# Patient Record
Sex: MALE | Race: WHITE | NOT HISPANIC OR LATINO | Employment: OTHER | URBAN - METROPOLITAN AREA
[De-identification: names, ages, dates, MRNs, and addresses within clinical notes are randomized per-mention and may not be internally consistent; named-entity substitution may affect disease eponyms.]

---

## 2021-09-14 ENCOUNTER — LAB REQUISITION (OUTPATIENT)
Dept: LAB | Facility: CLINIC | Age: 86
End: 2021-09-14
Payer: MEDICARE

## 2021-09-14 DIAGNOSIS — I10 ESSENTIAL (PRIMARY) HYPERTENSION: ICD-10-CM

## 2021-09-14 DIAGNOSIS — E03.9 HYPOTHYROIDISM, UNSPECIFIED: ICD-10-CM

## 2021-09-14 DIAGNOSIS — D64.9 ANEMIA, UNSPECIFIED: ICD-10-CM

## 2021-09-14 DIAGNOSIS — E53.8 DEFICIENCY OF OTHER SPECIFIED B GROUP VITAMINS: ICD-10-CM

## 2021-09-14 DIAGNOSIS — E61.2 MAGNESIUM DEFICIENCY: ICD-10-CM

## 2021-09-14 DIAGNOSIS — E55.9 VITAMIN D DEFICIENCY, UNSPECIFIED: ICD-10-CM

## 2021-09-15 LAB
ANION GAP SERPL CALCULATED.3IONS-SCNC: 9 MMOL/L (ref 5–18)
BUN SERPL-MCNC: 23 MG/DL (ref 8–28)
CALCIUM SERPL-MCNC: 9.1 MG/DL (ref 8.5–10.5)
CHLORIDE BLD-SCNC: 103 MMOL/L (ref 98–107)
CO2 SERPL-SCNC: 32 MMOL/L (ref 22–31)
CREAT SERPL-MCNC: 1.21 MG/DL (ref 0.7–1.3)
DEPRECATED CALCIDIOL+CALCIFEROL SERPL-MC: 64 UG/L (ref 30–80)
ERYTHROCYTE [DISTWIDTH] IN BLOOD BY AUTOMATED COUNT: 15.9 % (ref 10–15)
GFR SERPL CREATININE-BSD FRML MDRD: 52 ML/MIN/1.73M2
GLUCOSE BLD-MCNC: 92 MG/DL (ref 70–125)
HCT VFR BLD AUTO: 37.8 % (ref 40–53)
HGB BLD-MCNC: 12.6 G/DL (ref 13.3–17.7)
MAGNESIUM SERPL-MCNC: 2 MG/DL (ref 1.8–2.6)
MCH RBC QN AUTO: 29.9 PG (ref 26.5–33)
MCHC RBC AUTO-ENTMCNC: 33.3 G/DL (ref 31.5–36.5)
MCV RBC AUTO: 90 FL (ref 78–100)
PLATELET # BLD AUTO: 164 10E3/UL (ref 150–450)
POTASSIUM BLD-SCNC: 3.9 MMOL/L (ref 3.5–5)
RBC # BLD AUTO: 4.21 10E6/UL (ref 4.4–5.9)
SODIUM SERPL-SCNC: 144 MMOL/L (ref 136–145)
TSH SERPL DL<=0.005 MIU/L-ACNC: 2.43 UIU/ML (ref 0.3–5)
VIT B12 SERPL-MCNC: 679 PG/ML (ref 213–816)
WBC # BLD AUTO: 4.8 10E3/UL (ref 4–11)

## 2021-09-15 PROCEDURE — 80048 BASIC METABOLIC PNL TOTAL CA: CPT | Mod: ORL | Performed by: NURSE PRACTITIONER

## 2021-09-15 PROCEDURE — P9604 ONE-WAY ALLOW PRORATED TRIP: HCPCS | Mod: ORL | Performed by: NURSE PRACTITIONER

## 2021-09-15 PROCEDURE — 83735 ASSAY OF MAGNESIUM: CPT | Mod: ORL | Performed by: NURSE PRACTITIONER

## 2021-09-15 PROCEDURE — 84443 ASSAY THYROID STIM HORMONE: CPT | Mod: ORL | Performed by: NURSE PRACTITIONER

## 2021-09-15 PROCEDURE — 85027 COMPLETE CBC AUTOMATED: CPT | Mod: ORL | Performed by: NURSE PRACTITIONER

## 2021-09-15 PROCEDURE — 36415 COLL VENOUS BLD VENIPUNCTURE: CPT | Mod: ORL | Performed by: NURSE PRACTITIONER

## 2021-09-15 PROCEDURE — 82306 VITAMIN D 25 HYDROXY: CPT | Mod: ORL | Performed by: NURSE PRACTITIONER

## 2021-09-15 PROCEDURE — 82607 VITAMIN B-12: CPT | Mod: ORL | Performed by: NURSE PRACTITIONER

## 2021-12-09 ENCOUNTER — LAB REQUISITION (OUTPATIENT)
Dept: LAB | Facility: CLINIC | Age: 86
End: 2021-12-09
Payer: MEDICARE

## 2021-12-09 DIAGNOSIS — N39.0 URINARY TRACT INFECTION, SITE NOT SPECIFIED: ICD-10-CM

## 2021-12-09 LAB
ALBUMIN UR-MCNC: 70 MG/DL
APPEARANCE UR: CLEAR
BACTERIA #/AREA URNS HPF: ABNORMAL /HPF
BILIRUB UR QL STRIP: NEGATIVE
COLOR UR AUTO: ABNORMAL
GLUCOSE UR STRIP-MCNC: NEGATIVE MG/DL
HGB UR QL STRIP: ABNORMAL
KETONES UR STRIP-MCNC: NEGATIVE MG/DL
LEUKOCYTE ESTERASE UR QL STRIP: ABNORMAL
MUCOUS THREADS #/AREA URNS LPF: PRESENT /LPF
NITRATE UR QL: NEGATIVE
PH UR STRIP: 7 [PH] (ref 5–7)
RBC URINE: >182 /HPF
SP GR UR STRIP: 1.01 (ref 1–1.03)
UROBILINOGEN UR STRIP-MCNC: <2 MG/DL
WBC CLUMPS #/AREA URNS HPF: PRESENT /HPF
WBC URINE: 51 /HPF

## 2021-12-09 PROCEDURE — 81001 URINALYSIS AUTO W/SCOPE: CPT | Mod: ORL | Performed by: NURSE PRACTITIONER

## 2021-12-09 PROCEDURE — 87088 URINE BACTERIA CULTURE: CPT | Mod: ORL | Performed by: NURSE PRACTITIONER

## 2021-12-12 LAB — BACTERIA UR CULT: ABNORMAL

## 2021-12-16 ENCOUNTER — LAB REQUISITION (OUTPATIENT)
Dept: LAB | Facility: CLINIC | Age: 86
End: 2021-12-16
Payer: MEDICARE

## 2021-12-16 DIAGNOSIS — Z11.52 ENCOUNTER FOR SCREENING FOR COVID-19: ICD-10-CM

## 2021-12-16 PROCEDURE — U0005 INFEC AGEN DETEC AMPLI PROBE: HCPCS | Mod: ORL | Performed by: NURSE PRACTITIONER

## 2021-12-17 LAB — SARS-COV-2 RNA RESP QL NAA+PROBE: NEGATIVE

## 2021-12-20 ENCOUNTER — LAB REQUISITION (OUTPATIENT)
Dept: LAB | Facility: CLINIC | Age: 86
End: 2021-12-20
Payer: MEDICARE

## 2021-12-20 DIAGNOSIS — J09.X2 INFLUENZA DUE TO IDENTIFIED NOVEL INFLUENZA A VIRUS WITH OTHER RESPIRATORY MANIFESTATIONS: ICD-10-CM

## 2021-12-20 LAB
FLUAV AG SPEC QL IA: NEGATIVE
FLUBV AG SPEC QL IA: NEGATIVE

## 2021-12-20 PROCEDURE — 87804 INFLUENZA ASSAY W/OPTIC: CPT | Mod: ORL | Performed by: NURSE PRACTITIONER

## 2022-01-01 ENCOUNTER — LAB REQUISITION (OUTPATIENT)
Dept: LAB | Facility: CLINIC | Age: 87
End: 2022-01-01
Payer: MEDICARE

## 2022-01-01 DIAGNOSIS — D64.9 ANEMIA, UNSPECIFIED: ICD-10-CM

## 2022-01-01 DIAGNOSIS — I10 ESSENTIAL (PRIMARY) HYPERTENSION: ICD-10-CM

## 2022-01-01 LAB
ANION GAP SERPL CALCULATED.3IONS-SCNC: 14 MMOL/L (ref 7–15)
BUN SERPL-MCNC: 28 MG/DL (ref 8–23)
CALCIUM SERPL-MCNC: 9.8 MG/DL (ref 8.2–9.6)
CHLORIDE SERPL-SCNC: 98 MMOL/L (ref 98–107)
CREAT SERPL-MCNC: 1.18 MG/DL (ref 0.67–1.17)
DEPRECATED HCO3 PLAS-SCNC: 30 MMOL/L (ref 22–29)
ERYTHROCYTE [DISTWIDTH] IN BLOOD BY AUTOMATED COUNT: 16.1 % (ref 10–15)
GFR SERPL CREATININE-BSD FRML MDRD: 58 ML/MIN/1.73M2
GLUCOSE SERPL-MCNC: 111 MG/DL (ref 70–99)
HCT VFR BLD AUTO: 44.7 % (ref 40–53)
HGB BLD-MCNC: 13.8 G/DL (ref 13.3–17.7)
MCH RBC QN AUTO: 27.4 PG (ref 26.5–33)
MCHC RBC AUTO-ENTMCNC: 30.9 G/DL (ref 31.5–36.5)
MCV RBC AUTO: 89 FL (ref 78–100)
PLATELET # BLD AUTO: 162 10E3/UL (ref 150–450)
POTASSIUM SERPL-SCNC: 3.5 MMOL/L (ref 3.4–5.3)
RBC # BLD AUTO: 5.03 10E6/UL (ref 4.4–5.9)
SODIUM SERPL-SCNC: 142 MMOL/L (ref 136–145)
WBC # BLD AUTO: 5.6 10E3/UL (ref 4–11)

## 2022-01-01 PROCEDURE — 85027 COMPLETE CBC AUTOMATED: CPT | Mod: ORL | Performed by: NURSE PRACTITIONER

## 2022-01-01 PROCEDURE — 36415 COLL VENOUS BLD VENIPUNCTURE: CPT | Mod: ORL | Performed by: NURSE PRACTITIONER

## 2022-01-01 PROCEDURE — 80048 BASIC METABOLIC PNL TOTAL CA: CPT | Mod: ORL | Performed by: NURSE PRACTITIONER

## 2022-01-01 PROCEDURE — P9604 ONE-WAY ALLOW PRORATED TRIP: HCPCS | Mod: ORL | Performed by: NURSE PRACTITIONER

## 2022-01-07 ENCOUNTER — LAB REQUISITION (OUTPATIENT)
Dept: LAB | Facility: CLINIC | Age: 87
End: 2022-01-07
Payer: MEDICARE

## 2022-01-07 DIAGNOSIS — N39.0 URINARY TRACT INFECTION, SITE NOT SPECIFIED: ICD-10-CM

## 2022-01-07 LAB
ALBUMIN UR-MCNC: 300 MG/DL
APPEARANCE UR: ABNORMAL
BACTERIA #/AREA URNS HPF: ABNORMAL /HPF
BILIRUB UR QL STRIP: NEGATIVE
COLOR UR AUTO: YELLOW
GLUCOSE UR STRIP-MCNC: NEGATIVE MG/DL
HGB UR QL STRIP: ABNORMAL
KETONES UR STRIP-MCNC: NEGATIVE MG/DL
LEUKOCYTE ESTERASE UR QL STRIP: ABNORMAL
MUCOUS THREADS #/AREA URNS LPF: PRESENT /LPF
NITRATE UR QL: NEGATIVE
PH UR STRIP: 6 [PH] (ref 5–7)
RBC URINE: 98 /HPF
SP GR UR STRIP: 1.02 (ref 1–1.03)
UROBILINOGEN UR STRIP-MCNC: <2 MG/DL
WBC URINE: 71 /HPF

## 2022-01-07 PROCEDURE — 81001 URINALYSIS AUTO W/SCOPE: CPT | Mod: ORL | Performed by: NURSE PRACTITIONER

## 2022-01-07 PROCEDURE — 87086 URINE CULTURE/COLONY COUNT: CPT | Mod: ORL | Performed by: NURSE PRACTITIONER

## 2022-01-08 LAB — BACTERIA UR CULT: NO GROWTH

## 2022-01-11 ENCOUNTER — LAB REQUISITION (OUTPATIENT)
Dept: LAB | Facility: CLINIC | Age: 87
End: 2022-01-11
Payer: MEDICARE

## 2022-01-11 DIAGNOSIS — I10 ESSENTIAL (PRIMARY) HYPERTENSION: ICD-10-CM

## 2022-01-11 DIAGNOSIS — D64.9 ANEMIA, UNSPECIFIED: ICD-10-CM

## 2022-01-12 LAB
ANION GAP SERPL CALCULATED.3IONS-SCNC: 8 MMOL/L (ref 5–18)
BUN SERPL-MCNC: 23 MG/DL (ref 8–28)
CALCIUM SERPL-MCNC: 9.4 MG/DL (ref 8.5–10.5)
CHLORIDE BLD-SCNC: 102 MMOL/L (ref 98–107)
CO2 SERPL-SCNC: 32 MMOL/L (ref 22–31)
CREAT SERPL-MCNC: 1.05 MG/DL (ref 0.7–1.3)
ERYTHROCYTE [DISTWIDTH] IN BLOOD BY AUTOMATED COUNT: 16.1 % (ref 10–15)
GFR SERPL CREATININE-BSD FRML MDRD: 67 ML/MIN/1.73M2
GLUCOSE BLD-MCNC: 80 MG/DL (ref 70–125)
HCT VFR BLD AUTO: 40.4 % (ref 40–53)
HGB BLD-MCNC: 13 G/DL (ref 13.3–17.7)
MCH RBC QN AUTO: 28.9 PG (ref 26.5–33)
MCHC RBC AUTO-ENTMCNC: 32.2 G/DL (ref 31.5–36.5)
MCV RBC AUTO: 90 FL (ref 78–100)
PLATELET # BLD AUTO: 141 10E3/UL (ref 150–450)
POTASSIUM BLD-SCNC: 3.9 MMOL/L (ref 3.5–5)
RBC # BLD AUTO: 4.5 10E6/UL (ref 4.4–5.9)
SODIUM SERPL-SCNC: 142 MMOL/L (ref 136–145)
WBC # BLD AUTO: 4.7 10E3/UL (ref 4–11)

## 2022-01-12 PROCEDURE — 85027 COMPLETE CBC AUTOMATED: CPT | Mod: ORL | Performed by: NURSE PRACTITIONER

## 2022-01-12 PROCEDURE — P9604 ONE-WAY ALLOW PRORATED TRIP: HCPCS | Mod: ORL | Performed by: NURSE PRACTITIONER

## 2022-01-12 PROCEDURE — 80048 BASIC METABOLIC PNL TOTAL CA: CPT | Mod: ORL | Performed by: NURSE PRACTITIONER

## 2022-01-12 PROCEDURE — 36415 COLL VENOUS BLD VENIPUNCTURE: CPT | Mod: ORL | Performed by: NURSE PRACTITIONER

## 2022-05-24 ENCOUNTER — MEDICAL CORRESPONDENCE (OUTPATIENT)
Dept: MEDSURG UNIT | Facility: HOSPITAL | Age: 87
End: 2022-05-24

## 2022-06-14 ENCOUNTER — LAB REQUISITION (OUTPATIENT)
Dept: LAB | Facility: CLINIC | Age: 87
End: 2022-06-14
Payer: MEDICARE

## 2022-06-14 DIAGNOSIS — D64.9 ANEMIA, UNSPECIFIED: ICD-10-CM

## 2022-06-14 DIAGNOSIS — E83.42 HYPOMAGNESEMIA: ICD-10-CM

## 2022-06-14 DIAGNOSIS — I10 ESSENTIAL (PRIMARY) HYPERTENSION: ICD-10-CM

## 2022-06-15 LAB
ANION GAP SERPL CALCULATED.3IONS-SCNC: 12 MMOL/L (ref 5–18)
BUN SERPL-MCNC: 23 MG/DL (ref 8–28)
CALCIUM SERPL-MCNC: 9.4 MG/DL (ref 8.5–10.5)
CHLORIDE BLD-SCNC: 101 MMOL/L (ref 98–107)
CO2 SERPL-SCNC: 29 MMOL/L (ref 22–31)
CREAT SERPL-MCNC: 0.81 MG/DL (ref 0.7–1.3)
ERYTHROCYTE [DISTWIDTH] IN BLOOD BY AUTOMATED COUNT: 14.8 % (ref 10–15)
GFR SERPL CREATININE-BSD FRML MDRD: 83 ML/MIN/1.73M2
GLUCOSE BLD-MCNC: 90 MG/DL (ref 70–125)
HCT VFR BLD AUTO: 40.6 % (ref 40–53)
HGB BLD-MCNC: 13.4 G/DL (ref 13.3–17.7)
MAGNESIUM SERPL-MCNC: 1.9 MG/DL (ref 1.8–2.6)
MCH RBC QN AUTO: 28.6 PG (ref 26.5–33)
MCHC RBC AUTO-ENTMCNC: 33 G/DL (ref 31.5–36.5)
MCV RBC AUTO: 87 FL (ref 78–100)
PLATELET # BLD AUTO: 142 10E3/UL (ref 150–450)
POTASSIUM BLD-SCNC: 3.1 MMOL/L (ref 3.5–5)
RBC # BLD AUTO: 4.68 10E6/UL (ref 4.4–5.9)
SODIUM SERPL-SCNC: 142 MMOL/L (ref 136–145)
WBC # BLD AUTO: 5.1 10E3/UL (ref 4–11)

## 2022-06-15 PROCEDURE — 36415 COLL VENOUS BLD VENIPUNCTURE: CPT | Mod: ORL | Performed by: NURSE PRACTITIONER

## 2022-06-15 PROCEDURE — P9604 ONE-WAY ALLOW PRORATED TRIP: HCPCS | Mod: ORL | Performed by: NURSE PRACTITIONER

## 2022-06-15 PROCEDURE — 80048 BASIC METABOLIC PNL TOTAL CA: CPT | Mod: ORL | Performed by: NURSE PRACTITIONER

## 2022-06-15 PROCEDURE — 83735 ASSAY OF MAGNESIUM: CPT | Mod: ORL | Performed by: NURSE PRACTITIONER

## 2022-06-15 PROCEDURE — 85027 COMPLETE CBC AUTOMATED: CPT | Mod: ORL | Performed by: NURSE PRACTITIONER

## 2022-06-16 ENCOUNTER — LAB REQUISITION (OUTPATIENT)
Dept: LAB | Facility: CLINIC | Age: 87
End: 2022-06-16
Payer: MEDICARE

## 2022-06-16 DIAGNOSIS — E87.6 HYPOKALEMIA: ICD-10-CM

## 2022-06-17 LAB — POTASSIUM BLD-SCNC: 3.2 MMOL/L (ref 3.5–5)

## 2022-06-17 PROCEDURE — 36415 COLL VENOUS BLD VENIPUNCTURE: CPT | Mod: ORL | Performed by: NURSE PRACTITIONER

## 2022-06-17 PROCEDURE — P9604 ONE-WAY ALLOW PRORATED TRIP: HCPCS | Mod: ORL | Performed by: NURSE PRACTITIONER

## 2022-06-17 PROCEDURE — 84132 ASSAY OF SERUM POTASSIUM: CPT | Mod: ORL | Performed by: NURSE PRACTITIONER

## 2022-06-26 ENCOUNTER — APPOINTMENT (OUTPATIENT)
Dept: CT IMAGING | Facility: HOSPITAL | Age: 87
End: 2022-06-26
Attending: EMERGENCY MEDICINE
Payer: MEDICARE

## 2022-06-26 ENCOUNTER — APPOINTMENT (OUTPATIENT)
Dept: RADIOLOGY | Facility: HOSPITAL | Age: 87
End: 2022-06-26
Attending: EMERGENCY MEDICINE
Payer: MEDICARE

## 2022-06-26 ENCOUNTER — HOSPITAL ENCOUNTER (OUTPATIENT)
Facility: HOSPITAL | Age: 87
Setting detail: OBSERVATION
Discharge: HOME | End: 2022-06-27
Attending: EMERGENCY MEDICINE | Admitting: INTERNAL MEDICINE
Payer: MEDICARE

## 2022-06-26 DIAGNOSIS — Z79.01 ANTICOAGULATED BY ANTICOAGULATION TREATMENT: ICD-10-CM

## 2022-06-26 DIAGNOSIS — I48.20 CHRONIC ATRIAL FIBRILLATION (H): ICD-10-CM

## 2022-06-26 DIAGNOSIS — N30.00 ACUTE CYSTITIS WITHOUT HEMATURIA: ICD-10-CM

## 2022-06-26 DIAGNOSIS — Z93.59 SUPRAPUBIC CATHETER (H): ICD-10-CM

## 2022-06-26 DIAGNOSIS — M62.81 GENERALIZED MUSCLE WEAKNESS: ICD-10-CM

## 2022-06-26 DIAGNOSIS — J18.9 PNEUMONIA OF BOTH LUNGS DUE TO INFECTIOUS ORGANISM, UNSPECIFIED PART OF LUNG: ICD-10-CM

## 2022-06-26 PROBLEM — N39.0 URINARY TRACT INFECTION ASSOCIATED WITH CYSTOSTOMY CATHETER (H): Status: ACTIVE | Noted: 2022-06-26

## 2022-06-26 PROBLEM — T83.510A URINARY TRACT INFECTION ASSOCIATED WITH CYSTOSTOMY CATHETER (H): Status: ACTIVE | Noted: 2022-06-26

## 2022-06-26 PROBLEM — I10 HTN (HYPERTENSION): Status: ACTIVE | Noted: 2022-06-26

## 2022-06-26 PROBLEM — E87.6 HYPOKALEMIA: Status: ACTIVE | Noted: 2022-06-26

## 2022-06-26 LAB
ALBUMIN SERPL-MCNC: 4.1 G/DL (ref 3.5–5)
ALBUMIN UR-MCNC: 100 MG/DL
ALP SERPL-CCNC: 80 U/L (ref 45–120)
ALT SERPL W P-5'-P-CCNC: 11 U/L (ref 0–45)
ANION GAP SERPL CALCULATED.3IONS-SCNC: 10 MMOL/L (ref 5–18)
APPEARANCE UR: ABNORMAL
AST SERPL W P-5'-P-CCNC: 17 U/L (ref 0–40)
ATRIAL RATE - MUSE: 64 BPM
BACTERIA #/AREA URNS HPF: ABNORMAL /HPF
BASOPHILS # BLD AUTO: 0 10E3/UL (ref 0–0.2)
BASOPHILS NFR BLD AUTO: 0 %
BILIRUB DIRECT SERPL-MCNC: 0.6 MG/DL
BILIRUB SERPL-MCNC: 1.7 MG/DL (ref 0–1)
BILIRUB UR QL STRIP: NEGATIVE
BUN SERPL-MCNC: 22 MG/DL (ref 8–28)
CALCIUM SERPL-MCNC: 9.4 MG/DL (ref 8.5–10.5)
CHLORIDE BLD-SCNC: 102 MMOL/L (ref 98–107)
CO2 SERPL-SCNC: 29 MMOL/L (ref 22–31)
COLOR UR AUTO: YELLOW
CREAT SERPL-MCNC: 1.14 MG/DL (ref 0.7–1.3)
DIASTOLIC BLOOD PRESSURE - MUSE: 83 MMHG
EOSINOPHIL # BLD AUTO: 0 10E3/UL (ref 0–0.7)
EOSINOPHIL NFR BLD AUTO: 0 %
ERYTHROCYTE [DISTWIDTH] IN BLOOD BY AUTOMATED COUNT: 15.1 % (ref 10–15)
GFR SERPL CREATININE-BSD FRML MDRD: 61 ML/MIN/1.73M2
GLUCOSE BLD-MCNC: 106 MG/DL (ref 70–125)
GLUCOSE UR STRIP-MCNC: NEGATIVE MG/DL
HCT VFR BLD AUTO: 43 % (ref 40–53)
HGB BLD-MCNC: 14.1 G/DL (ref 13.3–17.7)
HGB UR QL STRIP: ABNORMAL
HOLD SPECIMEN: NORMAL
HOLD SPECIMEN: NORMAL
IMM GRANULOCYTES # BLD: 0.2 10E3/UL
IMM GRANULOCYTES NFR BLD: 2 %
INTERPRETATION ECG - MUSE: NORMAL
KETONES UR STRIP-MCNC: NEGATIVE MG/DL
LACTATE SERPL-SCNC: 1.3 MMOL/L (ref 0.7–2)
LEUKOCYTE ESTERASE UR QL STRIP: ABNORMAL
LYMPHOCYTES # BLD AUTO: 0.6 10E3/UL (ref 0.8–5.3)
LYMPHOCYTES NFR BLD AUTO: 6 %
MAGNESIUM SERPL-MCNC: 2 MG/DL (ref 1.8–2.6)
MCH RBC QN AUTO: 28.5 PG (ref 26.5–33)
MCHC RBC AUTO-ENTMCNC: 32.8 G/DL (ref 31.5–36.5)
MCV RBC AUTO: 87 FL (ref 78–100)
MONOCYTES # BLD AUTO: 1 10E3/UL (ref 0–1.3)
MONOCYTES NFR BLD AUTO: 10 %
MUCOUS THREADS #/AREA URNS LPF: PRESENT /LPF
NEUTROPHILS # BLD AUTO: 8.9 10E3/UL (ref 1.6–8.3)
NEUTROPHILS NFR BLD AUTO: 82 %
NITRATE UR QL: NEGATIVE
NRBC # BLD AUTO: 0 10E3/UL
NRBC BLD AUTO-RTO: 0 /100
P AXIS - MUSE: 32 DEGREES
PH UR STRIP: 7 [PH] (ref 5–7)
PLATELET # BLD AUTO: 157 10E3/UL (ref 150–450)
POTASSIUM BLD-SCNC: 3.4 MMOL/L (ref 3.5–5)
POTASSIUM BLD-SCNC: 3.5 MMOL/L (ref 3.5–5)
PR INTERVAL - MUSE: NORMAL MS
PROT SERPL-MCNC: 7.2 G/DL (ref 6–8)
QRS DURATION - MUSE: 166 MS
QT - MUSE: 438 MS
QTC - MUSE: 475 MS
R AXIS - MUSE: 122 DEGREES
RBC # BLD AUTO: 4.95 10E6/UL (ref 4.4–5.9)
RBC URINE: 19 /HPF
SARS-COV-2 RNA RESP QL NAA+PROBE: NEGATIVE
SODIUM SERPL-SCNC: 141 MMOL/L (ref 136–145)
SP GR UR STRIP: 1.01 (ref 1–1.03)
SYSTOLIC BLOOD PRESSURE - MUSE: 162 MMHG
T AXIS - MUSE: -66 DEGREES
TROPONIN I SERPL-MCNC: 0.03 NG/ML (ref 0–0.29)
UROBILINOGEN UR STRIP-MCNC: <2 MG/DL
VENTRICULAR RATE- MUSE: 71 BPM
WBC # BLD AUTO: 10.6 10E3/UL (ref 4–11)
WBC CLUMPS #/AREA URNS HPF: PRESENT /HPF
WBC URINE: 74 /HPF

## 2022-06-26 PROCEDURE — 99219 PR INITIAL OBSERVATION CARE,LEVEL II: CPT | Performed by: INTERNAL MEDICINE

## 2022-06-26 PROCEDURE — 85025 COMPLETE CBC W/AUTO DIFF WBC: CPT | Performed by: EMERGENCY MEDICINE

## 2022-06-26 PROCEDURE — 36415 COLL VENOUS BLD VENIPUNCTURE: CPT | Performed by: EMERGENCY MEDICINE

## 2022-06-26 PROCEDURE — 96366 THER/PROPH/DIAG IV INF ADDON: CPT

## 2022-06-26 PROCEDURE — 84484 ASSAY OF TROPONIN QUANT: CPT | Performed by: EMERGENCY MEDICINE

## 2022-06-26 PROCEDURE — 258N000003 HC RX IP 258 OP 636: Performed by: INTERNAL MEDICINE

## 2022-06-26 PROCEDURE — 93005 ELECTROCARDIOGRAM TRACING: CPT | Performed by: EMERGENCY MEDICINE

## 2022-06-26 PROCEDURE — G0378 HOSPITAL OBSERVATION PER HR: HCPCS

## 2022-06-26 PROCEDURE — 96367 TX/PROPH/DG ADDL SEQ IV INF: CPT

## 2022-06-26 PROCEDURE — 96361 HYDRATE IV INFUSION ADD-ON: CPT

## 2022-06-26 PROCEDURE — 81001 URINALYSIS AUTO W/SCOPE: CPT | Performed by: EMERGENCY MEDICINE

## 2022-06-26 PROCEDURE — 83605 ASSAY OF LACTIC ACID: CPT | Performed by: EMERGENCY MEDICINE

## 2022-06-26 PROCEDURE — 96365 THER/PROPH/DIAG IV INF INIT: CPT

## 2022-06-26 PROCEDURE — 258N000003 HC RX IP 258 OP 636: Performed by: EMERGENCY MEDICINE

## 2022-06-26 PROCEDURE — 83735 ASSAY OF MAGNESIUM: CPT | Performed by: EMERGENCY MEDICINE

## 2022-06-26 PROCEDURE — 250N000011 HC RX IP 250 OP 636: Performed by: EMERGENCY MEDICINE

## 2022-06-26 PROCEDURE — 250N000013 HC RX MED GY IP 250 OP 250 PS 637: Performed by: INTERNAL MEDICINE

## 2022-06-26 PROCEDURE — 99285 EMERGENCY DEPT VISIT HI MDM: CPT | Mod: 25

## 2022-06-26 PROCEDURE — G1010 CDSM STANSON: HCPCS

## 2022-06-26 PROCEDURE — 96375 TX/PRO/DX INJ NEW DRUG ADDON: CPT

## 2022-06-26 PROCEDURE — 82248 BILIRUBIN DIRECT: CPT | Performed by: EMERGENCY MEDICINE

## 2022-06-26 PROCEDURE — 36415 COLL VENOUS BLD VENIPUNCTURE: CPT | Performed by: INTERNAL MEDICINE

## 2022-06-26 PROCEDURE — 71046 X-RAY EXAM CHEST 2 VIEWS: CPT

## 2022-06-26 PROCEDURE — 87635 SARS-COV-2 COVID-19 AMP PRB: CPT | Performed by: EMERGENCY MEDICINE

## 2022-06-26 PROCEDURE — C9803 HOPD COVID-19 SPEC COLLECT: HCPCS

## 2022-06-26 PROCEDURE — 87088 URINE BACTERIA CULTURE: CPT | Performed by: EMERGENCY MEDICINE

## 2022-06-26 PROCEDURE — 84132 ASSAY OF SERUM POTASSIUM: CPT | Performed by: INTERNAL MEDICINE

## 2022-06-26 RX ORDER — METOPROLOL TARTRATE 50 MG
50 TABLET ORAL 2 TIMES DAILY
COMMUNITY

## 2022-06-26 RX ORDER — METOPROLOL TARTRATE 25 MG/1
50 TABLET, FILM COATED ORAL 2 TIMES DAILY
Status: DISCONTINUED | OUTPATIENT
Start: 2022-06-26 | End: 2022-06-27 | Stop reason: HOSPADM

## 2022-06-26 RX ORDER — FUROSEMIDE 40 MG
40 TABLET ORAL DAILY
Status: DISCONTINUED | OUTPATIENT
Start: 2022-06-26 | End: 2022-06-27 | Stop reason: HOSPADM

## 2022-06-26 RX ORDER — PANTOPRAZOLE SODIUM 20 MG/1
20 TABLET, DELAYED RELEASE ORAL DAILY
COMMUNITY

## 2022-06-26 RX ORDER — AZITHROMYCIN 250 MG/1
250 TABLET, FILM COATED ORAL DAILY
Status: DISCONTINUED | OUTPATIENT
Start: 2022-06-27 | End: 2022-06-27 | Stop reason: HOSPADM

## 2022-06-26 RX ORDER — POTASSIUM CHLORIDE 1500 MG/1
20 TABLET, EXTENDED RELEASE ORAL ONCE
Status: COMPLETED | OUTPATIENT
Start: 2022-06-26 | End: 2022-06-26

## 2022-06-26 RX ORDER — PANTOPRAZOLE SODIUM 20 MG/1
20 TABLET, DELAYED RELEASE ORAL DAILY
Status: DISCONTINUED | OUTPATIENT
Start: 2022-06-26 | End: 2022-06-27 | Stop reason: HOSPADM

## 2022-06-26 RX ORDER — POTASSIUM CHLORIDE 750 MG/1
10 CAPSULE, EXTENDED RELEASE ORAL DAILY
Status: ON HOLD | COMMUNITY
End: 2023-01-01

## 2022-06-26 RX ORDER — CHOLECALCIFEROL (VITAMIN D3) 50 MCG
1 TABLET ORAL DAILY
COMMUNITY

## 2022-06-26 RX ORDER — CEFTRIAXONE 1 G/1
1 INJECTION, POWDER, FOR SOLUTION INTRAMUSCULAR; INTRAVENOUS EVERY 24 HOURS
Status: DISCONTINUED | OUTPATIENT
Start: 2022-06-27 | End: 2022-06-27 | Stop reason: HOSPADM

## 2022-06-26 RX ORDER — SODIUM CHLORIDE 9 MG/ML
INJECTION, SOLUTION INTRAVENOUS CONTINUOUS
Status: DISCONTINUED | OUTPATIENT
Start: 2022-06-26 | End: 2022-06-27 | Stop reason: HOSPADM

## 2022-06-26 RX ORDER — CEFTRIAXONE 2 G/1
2 INJECTION, POWDER, FOR SOLUTION INTRAMUSCULAR; INTRAVENOUS ONCE
Status: COMPLETED | OUTPATIENT
Start: 2022-06-26 | End: 2022-06-26

## 2022-06-26 RX ORDER — FUROSEMIDE 40 MG
40 TABLET ORAL DAILY
Status: ON HOLD | COMMUNITY
End: 2023-01-01

## 2022-06-26 RX ADMIN — APIXABAN 5 MG: 5 TABLET, FILM COATED ORAL at 20:46

## 2022-06-26 RX ADMIN — METOPROLOL TARTRATE 50 MG: 25 TABLET, FILM COATED ORAL at 20:47

## 2022-06-26 RX ADMIN — CEFTRIAXONE SODIUM 2 G: 2 INJECTION, POWDER, FOR SOLUTION INTRAMUSCULAR; INTRAVENOUS at 15:10

## 2022-06-26 RX ADMIN — PANTOPRAZOLE SODIUM 20 MG: 20 TABLET, DELAYED RELEASE ORAL at 20:47

## 2022-06-26 RX ADMIN — SODIUM CHLORIDE 500 ML: 9 INJECTION, SOLUTION INTRAVENOUS at 12:47

## 2022-06-26 RX ADMIN — POTASSIUM CHLORIDE 20 MEQ: 1500 TABLET, EXTENDED RELEASE ORAL at 21:46

## 2022-06-26 RX ADMIN — SODIUM CHLORIDE: 9 INJECTION, SOLUTION INTRAVENOUS at 18:44

## 2022-06-26 RX ADMIN — AZITHROMYCIN MONOHYDRATE 500 MG: 500 INJECTION, POWDER, LYOPHILIZED, FOR SOLUTION INTRAVENOUS at 15:45

## 2022-06-26 ASSESSMENT — ENCOUNTER SYMPTOMS
DIARRHEA: 0
ABDOMINAL PAIN: 0
FATIGUE: 1
VOMITING: 0
RHINORRHEA: 0
COUGH: 0
HEADACHES: 0
SHORTNESS OF BREATH: 0
EYES NEGATIVE: 1
WEAKNESS: 1
CONFUSION: 1

## 2022-06-26 NOTE — ED TRIAGE NOTES
Patient arrives by EMS from assisted living.  Per EMS report, patient's family was concerned with his sudden weakness.  Patient is normally independent with a walker and was requiring a assist of two to get out of the chair.  Patient also had sudden onset of intermittent confusion.  Patient has a suprapubic catheter that was changed last Thursday (per patient report).  Erythema around catheter site and rhys, cloudy urine.  BP enroute was 180's but patient hasn't taken his AM medications today, per EMS report.  On eliquis for Afib.  O2 sat 90% RA, placed patient on 2L O2 via NC.       Triage Assessment     Row Name 06/26/22 1152       Triage Assessment (Adult)    Airway WDL WDL       Respiratory WDL    Respiratory WDL WDL       Skin Circulation/Temperature WDL    Skin Circulation/Temperature WDL X  pallor       Cardiac WDL    Cardiac WDL X       Peripheral/Neurovascular WDL    Peripheral Neurovascular WDL WDL       Cognitive/Neuro/Behavioral WDL    Cognitive/Neuro/Behavioral WDL X    Level of Consciousness confused;lethargic  intermittent confusion with orientation questions    Arousal Level opens eyes spontaneously;arouses to voice    Orientation disoriented to;time;situation    Mood/Behavior flat affect;calm

## 2022-06-26 NOTE — PHARMACY-ADMISSION MEDICATION HISTORY
Pharmacy Note - Admission Medication History    Pertinent Provider Information: in the previous six months has used Bactrim DS and cefdinir.      ______________________________________________________________________    Prior To Admission (PTA) med list completed and updated in EMR.       PTA Med List   Medication Sig Last Dose     apixaban ANTICOAGULANT (ELIQUIS) 5 MG tablet Take 5 mg by mouth 2 times daily 6/25/2022 at pm     furosemide (LASIX) 40 MG tablet Take 40 mg by mouth daily 6/25/2022 at am     metoprolol tartrate (LOPRESSOR) 50 MG tablet Take 50 mg by mouth 2 times daily 6/25/2022 at pm     pantoprazole (PROTONIX) 20 MG EC tablet Take 20 mg by mouth daily 6/25/2022 at am     potassium chloride ER (MICRO-K) 10 MEQ CR capsule Take 10 mEq by mouth daily 6/25/2022 at am     vitamin D3 (CHOLECALCIFEROL) 50 mcg (2000 units) tablet Take 1 tablet by mouth daily 6/25/2022 at am       Information source(s): Patient, Family member, Caregiver, Clinic records, Facility ([facility] medication list/MAR and CareEverywhere/SureScripts  Method of interview communication: in-person    Summary of Changes to PTA Med List  New: nothing   Discontinued: losartan  Changed: decreased pantoprazole from 40 mg    Patient was asked about OTC/herbal products specifically.  PTA med list reflects this.    In the past week, patient estimated taking medication this percent of the time:  greater than 90%.    Allergies were reviewed, assessed, and updated with the patient.      Patient does not use any multi-dose medications prior to admission.    The information provided in this note is only as accurate as the sources available at the time of the update(s).    Thank you for the opportunity to participate in the care of this patient.    Alvaro Kaye [facility]  6/26/2022 2:06 PM

## 2022-06-26 NOTE — ED NOTES
Bed: JNED-04  Expected date: 6/26/22  Expected time: 11:30 AM  Means of arrival:   Comments:  weakness

## 2022-06-26 NOTE — ED PROVIDER NOTES
EMERGENCY DEPARTMENT ENCOUNTER      NAME: David Dow  AGE: 89 year old male  YOB: 1930  MRN: [id number]  EVALUATION DATE & TIME: 6/26/2022 11:44 AM    PCP: No primary care provider on file.    ED PROVIDER: Nicole Anderson M.D.      CHIEF COMPLAINT     Chief Complaint   Patient presents with     Generalized Weakness         FINAL IMPRESSION:     1. Generalized muscle weakness    2. Chronic atrial fibrillation (H)    3. Anticoagulated by anticoagulation treatment    4. Suprapubic catheter (H)    5. Acute cystitis without hematuria    6. Pneumonia of both lungs due to infectious organism, unspecified part of lung          MEDICAL DECISION MAKING:       Pertinent Labs & Imaging studies reviewed. (See chart for details)    89 year old male presents to the Emergency Department for evaluation of weakness unable to get out of the chair per family appears confused.    ED Course as of 06/26/22 1519   Sun Jun 26, 2022   1238 Mr. Dow is a 89-year-old gentleman who lives independently in assisted living with his wife.  He is the primary caregiver.  The family noted that today he was more confused than usual had a difficult time getting up from his recliner and they want him evaluated.  Patient has a history of suprapubic catheter.  Chronic A. fib anticoagulated.  Denies any trauma.   1239 Examination patient looks younger than stated age but looks like he does not feel well.  He is nontoxic.  Has dry mucous membranes.  Rhythm is almost regular.  Abdomen is soft he has a suprapubic catheter with some erythema but no gross purulence.  No lower extremity edema.  He is oriented to self only.   1239 Differential diagnosis include but not limited to intracranial hemorrhage sepsis electrolyte abnormalities anemia acute coronary syndrome among others.   1330 troponin 0.03.  Normal liver function test normal lactic acid normal white blood cell count and hemoglobin magnesium of 2.  Patient was sleeping saturation  decreased to 9.   1332 Patient with decreased saturations when sleeping to 90%.  Son states patient has a history of sleep apnea and does not wear his CPAP.   1333 Urea Nitrogen: 22   1348 Most recent urine culture negative but back in 2021 he grew out the Klebsiella susceptible to Rocephin therefore we will give.   1410 X-ray does reveal bilateral opacities concerning for pneumonia.  Already received Rocephin will do azithromycin for community-acquired pneumonia.   1421 spoke with hospitalist recommends MedSurg observation.   1422 Patient and family updated.   1422 Clinical impression and decision making 89-year-old male lives independently more weak today.  Has a suprapubic catheter urine concerning for infection.  Chest x-ray reveals pneumonia requiring minimal oxygen.  Started on Rocephin and azithromycin.   1422 .    Admitted for IV antibiotics PT evaluation         Vital Signs: Hypertension  EKG: Sinus rhythm and PVCs right bundle branch block inverted T waves in V1 V2 V3 V4 V5 2 3 aVF  Imaging: CT head no acute chest x-ray no megaly consolidation.  Home Meds: Reviewed  ED meds/abx: Rocephin azithromycin  Fluids: 500 NS    Labs  K 3.4  Cr 1.14  Wbc 10.6  Hgb 14.1  platelets  Troponin 0.03      Review of Previous Records  12/9/2021 [facility]  Urine culture tested positive for Klebsiella oxytoca resistant to ampicillin and cefazolin.     1/7/2022 [facility]  Urine culture did not have any growth     6/16/2022 [facility]  Labs were performed. Potassium was slightly decreased at 3.2.    Consults  Hospitalist -Dr. Osorio Ledesma    ED COURSE     11:55 AM I met with the patient, obtained history, performed an initial exam, and discussed options and plan for diagnostics and treatment here in the ED.     12:45 PM Patient's family has arrived and I spoke with them.     1:49 PM Updated patient and family.     2:14 PM Spoke with the hospitalist, Dr. Osorio Ledesma  [facility] observation    2:25 PM patient, wife, son updated in agreement with plan.  Patient somewhat upset about having to stay but son is explaining to patient that it is the recommended plan he eventually agrees.    At the conclusion of the encounter I discussed the results of all of the tests and the disposition. The questions were answered. The patient acknowledged understanding and was agreeable with the care plan.           MEDICATIONS GIVEN IN THE EMERGENCY:     Medications   cefTRIAXone (ROCEPHIN) 2 g vial to attach to  ml bag for ADULTS or NS 50 ml bag for PEDS (2 g Intravenous New Bag 6/26/22 1510)   azithromycin (ZITHROMAX) 500 mg in sodium chloride 0.9 % 250 mL intermittent infusion (has no administration in time range)   0.9% sodium chloride BOLUS (0 mLs Intravenous Stopped 6/26/22 1510)       NEW PRESCRIPTIONS STARTED AT TODAY'S ER VISIT     New Prescriptions    No medications on file          =================================================================    HPI     Patient information was obtained from: patient, patient's family, and EMS    Use of : N/A    David Dow is a 89 year old male who presents by EMS for evaluation of generalized weakness.    Per patient's son, patient woke up this morning feeling more tired than usual despite sleeping fine last night. Patient usually sleeps in a recliner and does not use his CPAP machine while doing so. Patient's wife helped patient to the bathroom with a walker with a seat to have a bowel movement. Afterwards, wife was able to get patient back to his recliner but not into it. He got stuck in a slumped over position due to weakness not loss of consciousness. The assisted living contacted his son, who came with his wife to check on the patient. Nursing aids were contacted to help move him but they were unable to. Family was able to move patient into his recliner, however, this was painful for the pain. Son called nonemergent EMS who  checked patient but decided he should be brought to the ED.     Per EMS, patient lives in an assisted living with his wife who is her primary caregiver. Upon EMS arrival, patient O2 saturation 92% on room air and glucose was 121. Patient was self-ambulatory and confused. Intermittently alert and oriented x3. EMS reported that he could sometimes give the correct year and month. Noted that patient has a history of atrial fibrillation and takes Eliquis. Patient is DNR. At baseline, patient is ambulatory and takes care of himself well.     Patient denies headache, eye problems, nose problems, mouth problems, chest pain, shortness of breath, cough, abdominal pain, emesis, diarrhea, or any other complaints at this time.     REVIEW OF SYSTEMS   Review of Systems   Constitutional: Positive for fatigue.   HENT: Negative for dental problem, mouth sores, nosebleeds and rhinorrhea.    Eyes: Negative.    Respiratory: Negative for cough and shortness of breath.    Cardiovascular: Negative for chest pain.   Gastrointestinal: Negative for abdominal pain, diarrhea and vomiting.   Neurological: Positive for weakness. Negative for headaches.        Negative for loss of consciousness.   Psychiatric/Behavioral: Positive for confusion.   All other systems reviewed and are negative.      PAST MEDICAL HISTORY:   History reviewed. No pertinent past medical history.    PAST SURGICAL HISTORY:   History reviewed. No pertinent surgical history.      CURRENT MEDICATIONS:   apixaban ANTICOAGULANT (ELIQUIS) 5 MG tablet  furosemide (LASIX) 40 MG tablet  metoprolol tartrate (LOPRESSOR) 50 MG tablet  pantoprazole (PROTONIX) 20 MG EC tablet  potassium chloride ER (MICRO-K) 10 MEQ CR capsule  vitamin D3 (CHOLECALCIFEROL) 50 mcg (2000 units) tablet         ALLERGIES:   No Known Allergies    FAMILY HISTORY:   History reviewed. No pertinent family history.    SOCIAL HISTORY:        VITALS:   BP (!) 152/86   Pulse 78   Temp 99.1  F (37.3  C) (Oral)    "Resp 21   Ht 1.651 m (5' 5\")   Wt 61.2 kg (135 lb)   SpO2 100%   BMI 22.47 kg/m      PHYSICAL EXAM     Physical Exam  Vitals and nursing note reviewed. Exam conducted with a chaperone present.   Constitutional:       Appearance: He is normal weight.      Comments: Nontoxic looks younger than stated age appears tired pale.   Skin:     Coloration: Skin is pale.   Neurological:      Mental Status: He is alert.      Comments: Oriented to self only.         Physical Exam   Constitutional: Pale, nontoxic, appears younger than stated age. No distress.     Head: Atraumatic.     Nose: Nose normal.     Mouth/Throat: Oropharynx is clear and moist.     Eyes: EOM are normal. Pupils are equal, round, and reactive to light.     Ears: Bilateral pearly white TMs.    Neck: Normal range of motion. Neck supple.     Cardiovascular: Normal rate, regular rhythm. 2/6 systolic murmur.     Pulmonary/Chest: Normal effort  and breath sounds normal.     Abdominal: Suprapubic catheter with surrounding erythema.     Musculoskeletal: Normal range of motion.     Neurological: Alert and oriented to self only.     Lymphatics: No lower extremity edema     : N/A    Skin: Skin is warm and dry.     Psychiatric: Normal mood and affect. Behavior is normal.       LAB:     All pertinent labs reviewed and interpreted.  Labs Ordered and Resulted from Time of ED Arrival to Time of ED Departure   BASIC METABOLIC PANEL - Abnormal       Result Value    Sodium 141      Potassium 3.4 (*)     Chloride 102      Carbon Dioxide (CO2) 29      Anion Gap 10      Urea Nitrogen 22      Creatinine 1.14      Calcium 9.4      Glucose 106      GFR Estimate 61     HEPATIC FUNCTION PANEL - Abnormal    Bilirubin Total 1.7 (*)     Bilirubin Direct 0.6 (*)     Protein Total 7.2      Albumin 4.1      Alkaline Phosphatase 80      AST 17      ALT 11     ROUTINE UA WITH MICROSCOPIC REFLEX TO CULTURE - Abnormal    Color Urine Yellow      Appearance Urine Turbid (*)     Glucose Urine " Negative      Bilirubin Urine Negative      Ketones Urine Negative      Specific Gravity Urine 1.014      Blood Urine 0.2 mg/dL (*)     pH Urine 7.0      Protein Albumin Urine 100  (*)     Urobilinogen Urine <2.0      Nitrite Urine Negative      Leukocyte Esterase Urine 500 Azul/uL (*)     Bacteria Urine Few (*)     WBC Clumps Urine Present (*)     Mucus Urine Present (*)     RBC Urine 19 (*)     WBC Urine 74 (*)    CBC WITH PLATELETS AND DIFFERENTIAL - Abnormal    WBC Count 10.6      RBC Count 4.95      Hemoglobin 14.1      Hematocrit 43.0      MCV 87      MCH 28.5      MCHC 32.8      RDW 15.1 (*)     Platelet Count 157      % Neutrophils 82      % Lymphocytes 6      % Monocytes 10      % Eosinophils 0      % Basophils 0      % Immature Granulocytes 2      NRBCs per 100 WBC 0      Absolute Neutrophils 8.9 (*)     Absolute Lymphocytes 0.6 (*)     Absolute Monocytes 1.0      Absolute Eosinophils 0.0      Absolute Basophils 0.0      Absolute Immature Granulocytes 0.2      Absolute NRBCs 0.0     MAGNESIUM - Normal    Magnesium 2.0     TROPONIN I - Normal    Troponin I 0.03     LACTIC ACID WHOLE BLOOD - Normal    Lactic Acid 1.3     COVID-19 VIRUS (CORONAVIRUS) BY PCR   URINE CULTURE        RADIOLOGY:     Reviewed all pertinent imaging. Please see official radiology report.  Chest XR,  PA & LAT   Final Result   IMPRESSION: Cardiomegaly with normal vascularity. Patchy bibasilar airspace opacity/pneumonia. No pleural effusion or pneumothorax. 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      Head CT w/o contrast   Final Result   IMPRESSION:   1.  No CT evidence for acute intracranial process.   2.  Brain atrophy and presumed chronic microvascular ischemic changes as above.           EKG:     EKG #1  Sinus rhythm right bundle branch block inverted T waves in 2 3 aVF V3 V4 V5  Rhythm is irregular.  Rhythm is irregular.  Patient does have a history of A. fib.    Time:[id number]    Ventricular  rate 71 bmp  Axis normal  LA interval ms  QRS duration 166 ms  QT//475 ms    Compared to previous EKG on no previous available for comparison  I have independently reviewed and interpreted the EKG(s) documented above.      PROCEDURES:     Procedures      I, Kylee Boles, am serving as a scribe to document services personally performed by Dr. Anderson based on my observation and the provider's statements to me. I, Nicole Anderson MD attest that Kylee Boles is acting in a scribe capacity, has observed my performance of the services and has documented them in accordance with my direction.    Nicole Anderson M.D.  Emergency Medicine  [facility] EMERGENCY DEPARTMENT  [facility] [facility] [address] [phone]  Dept: [phone]       Nicole Anderson MD  06/26/22 8625       Nicole Anderson MD  06/26/22 2162

## 2022-06-26 NOTE — H&P
Admission History and Physical   David Dow, 8/22/1930, [id number]  [facility]  No admission diagnoses are documented for this encounter.  PCP:System, Provider Not In, None   Admitting provider: Genevieve Cortez MD.    Code status:  No CPR- Do NOT Intubate          Extended Emergency Contact Information  Primary Emergency Contact: ANNALISA DOW  Home Phone: [phone]  Relation: Son  Secondary Emergency Contact: MYESHA DOW  Home Phone: [phone]  Relation: Daughter-in-Law       Assessment and Plan  Active Problems:    Generalized muscle weakness    Chronic atrial fibrillation (H)    Anticoagulated by anticoagulation treatment    Suprapubic catheter (H)    HTN (hypertension)    Urinary tract infection associated with cystostomy catheter (H)    Pneumonia of both lungs due to infectious organism    Hypokalemia    David Dow is a 89 year old male presenting with weakness     CAUTI, patient has chronic indwelling moreno per patient recently exchanged  - Continue IV ceftriaxone as previous Ucx sensitivities   - Ucx pending   - gentle IVF    CAP without SOB or cough, but looks dry   - Ceftriaxone and azithromycin  - gentle IVF  - hold lasix for now     Chronic afib  - continue metoprolol with hold parameters   - Continue eliquis     HTN  - metoprolol as above     Hypokalemia  - potassium replacement protocol     Weakness  - PT/OT     COVID-19 PCR Results    COVID-19 PCR Results 12/16/21   SARS CoV2 PCR Negative      Comments are available for some flowsheets but are not being displayed.         COVID-19 Antibody Results, Testing for Immunity    COVID-19 Antibody Results, Testing for Immunity   No data to display.           VTE prophylaxis:  DOAC  DIET: Orders Placed This Encounter      Low Saturated Fat Na <2400 mg    Drains/Lines: SPC  Weight bearing status: WBAt  Disposition/Barriers to discharge: pending therapy and improvement with abx   Code Status:No CPR- Do NOT Intubate    HPI: David Dow is  a 89 year old old male with h/o generalized weakness.     Per patient's son, patient woke up this morning feeling more tired than usual despite sleeping fine last night. Patient usually sleeps in a recliner and does not use his CPAP machine while doing so. Patient's wife helped patient to the bathroom with a walker with a seat to have a bowel movement. Afterwards, wife was able to get patient back to his recliner but not into it. He got stuck in a slumped over position due to weakness not loss of consciousness. The assisted living contacted his son, who came with his wife to check on the patient. Nursing aids were contacted to help move him but they were unable to. Family was able to move patient into his recliner, however, this was painful for the pain. Son called nonemergent EMS who checked patient but decided he should be brought to the ED.      Per EMS, patient lives in an assisted living with his wife who is her primary caregiver. Upon EMS arrival, patient O2 saturation 92% on room air and glucose was 121. Patient was self-ambulatory and confused. Intermittently alert and oriented x3. EMS reported that he could sometimes give the correct year and month. Noted that patient has a history of atrial fibrillation and takes Eliquis. Patient is DNR. At baseline, patient is ambulatory and takes care of himself well.      Patient denies headache, eye problems, nose problems, mouth problems, chest pain, shortness of breath, cough, abdominal pain, emesis, diarrhea, or any other complaints at this time.     Past Medical History:   Diagnosis Date     Anticoagulated by anticoagulation treatment 6/26/2022     Chronic atrial fibrillation (H) 6/26/2022     HTN (hypertension) 6/26/2022     Suprapubic catheter (H) 6/26/2022     History reviewed. No pertinent surgical history.  History reviewed. No pertinent family history.  Social History     Socioeconomic History     Marital status:      Spouse name: Not on file      Number of children: Not on file     Years of education: Not on file     Highest education level: Not on file   Occupational History     Not on file   Tobacco Use     Smoking status: Not on file     Smokeless tobacco: Not on file   Substance and Sexual Activity     Alcohol use: Not on file     Drug use: Not on file     Sexual activity: Not on file   Other Topics Concern     Not on file   Social History Narrative     Not on file     Social Determinants of Health     Financial Resource Strain: Not on file   Food Insecurity: Not on file   Transportation Needs: Not on file   Physical Activity: Not on file   Stress: Not on file   Social Connections: Not on file   Intimate Partner Violence: Not on file   Housing Stability: Not on file     No Known Allergies    PRIOR TO ADMISSION MEDICATIONS   (Not in a hospital admission)       REVIEW OF SYSTEMS:  12 point reviewed pertinent negatives and positives in HPI all others negative     PHYSICAL EXAM  Temp:  [99.1  F (37.3  C)] 99.1  F (37.3  C)  Pulse:  [65-78] 68  Resp:  [16-24] 20  BP: (144-182)/(67-86) 144/67  SpO2:  [93 %-100 %] 98 %  Wt Readings from Last 1 Encounters:   06/26/22 61.2 kg (135 lb)     Body mass index is 22.47 kg/m .  Physical Exam  Constitutional:       Comments: Chronically ill-appearing but NAD,    HENT:      Head: Normocephalic and atraumatic.      Nose: Nose normal.      Mouth/Throat:      Mouth: Mucous membranes are moist.      Pharynx: Oropharynx is clear.   Eyes:      Extraocular Movements: Extraocular movements intact.      Conjunctiva/sclera: Conjunctivae normal.   Neurological:      Mental Status: He is alert.         PERTINENT LABS and RADIOLOGY   Results for orders placed or performed during the hospital encounter of 06/26/22   Chest XR,  PA & LAT    Impression    IMPRESSION: Cardiomegaly with normal vascularity. Patchy bibasilar airspace opacity/pneumonia. No pleural effusion or pneumothorax. Left shoulder arthroplasty and advanced degenerative  arthropathy right shoulder.   Head CT w/o contrast    Impression    IMPRESSION:  1.  No CT evidence for acute intracranial process.  2.  Brain atrophy and presumed chronic microvascular ischemic changes as above.       Recent Labs   Lab 06/26/22  1235   WBC 10.6   HGB 14.1   MCV 87         POTASSIUM 3.4*   CHLORIDE 102   CO2 29   BUN 22   CR 1.14   ANIONGAP 10   JOSESITO 9.4      ALBUMIN 4.1   PROTTOTAL 7.2   BILITOTAL 1.7*   ALKPHOS 80   ALT 11   AST 17     EKG:RBBB, sinus rhythm       Genevieve Cortez MD  [facility] Medicine Service  [phone]

## 2022-06-27 ENCOUNTER — APPOINTMENT (OUTPATIENT)
Dept: OCCUPATIONAL THERAPY | Facility: HOSPITAL | Age: 87
End: 2022-06-27
Attending: INTERNAL MEDICINE
Payer: MEDICARE

## 2022-06-27 ENCOUNTER — APPOINTMENT (OUTPATIENT)
Dept: PHYSICAL THERAPY | Facility: HOSPITAL | Age: 87
End: 2022-06-27
Attending: INTERNAL MEDICINE
Payer: MEDICARE

## 2022-06-27 VITALS
RESPIRATION RATE: 20 BRPM | SYSTOLIC BLOOD PRESSURE: 138 MMHG | OXYGEN SATURATION: 95 % | TEMPERATURE: 98.2 F | WEIGHT: 135 LBS | BODY MASS INDEX: 22.49 KG/M2 | DIASTOLIC BLOOD PRESSURE: 68 MMHG | HEART RATE: 57 BPM | HEIGHT: 65 IN

## 2022-06-27 LAB
ANION GAP SERPL CALCULATED.3IONS-SCNC: 9 MMOL/L (ref 5–18)
BUN SERPL-MCNC: 20 MG/DL (ref 8–28)
CALCIUM SERPL-MCNC: 8.6 MG/DL (ref 8.5–10.5)
CHLORIDE BLD-SCNC: 106 MMOL/L (ref 98–107)
CO2 SERPL-SCNC: 28 MMOL/L (ref 22–31)
CREAT SERPL-MCNC: 1.17 MG/DL (ref 0.7–1.3)
ERYTHROCYTE [DISTWIDTH] IN BLOOD BY AUTOMATED COUNT: 15.1 % (ref 10–15)
GFR SERPL CREATININE-BSD FRML MDRD: 59 ML/MIN/1.73M2
GLUCOSE BLD-MCNC: 83 MG/DL (ref 70–125)
HCT VFR BLD AUTO: 37 % (ref 40–53)
HGB BLD-MCNC: 11.9 G/DL (ref 13.3–17.7)
MCH RBC QN AUTO: 28.5 PG (ref 26.5–33)
MCHC RBC AUTO-ENTMCNC: 32.2 G/DL (ref 31.5–36.5)
MCV RBC AUTO: 89 FL (ref 78–100)
PLATELET # BLD AUTO: 130 10E3/UL (ref 150–450)
POTASSIUM BLD-SCNC: 4.2 MMOL/L (ref 3.5–5)
RBC # BLD AUTO: 4.18 10E6/UL (ref 4.4–5.9)
SODIUM SERPL-SCNC: 143 MMOL/L (ref 136–145)
WBC # BLD AUTO: 7.4 10E3/UL (ref 4–11)

## 2022-06-27 PROCEDURE — 97165 OT EVAL LOW COMPLEX 30 MIN: CPT | Mod: GO

## 2022-06-27 PROCEDURE — 250N000013 HC RX MED GY IP 250 OP 250 PS 637: Performed by: INTERNAL MEDICINE

## 2022-06-27 PROCEDURE — 36415 COLL VENOUS BLD VENIPUNCTURE: CPT | Performed by: INTERNAL MEDICINE

## 2022-06-27 PROCEDURE — 97535 SELF CARE MNGMENT TRAINING: CPT | Mod: GO

## 2022-06-27 PROCEDURE — 258N000003 HC RX IP 258 OP 636: Performed by: INTERNAL MEDICINE

## 2022-06-27 PROCEDURE — G0378 HOSPITAL OBSERVATION PER HR: HCPCS

## 2022-06-27 PROCEDURE — 97110 THERAPEUTIC EXERCISES: CPT | Mod: GP | Performed by: PHYSICAL THERAPIST

## 2022-06-27 PROCEDURE — 99217 PR OBSERVATION CARE DISCHARGE: CPT | Performed by: INTERNAL MEDICINE

## 2022-06-27 PROCEDURE — 85027 COMPLETE CBC AUTOMATED: CPT | Performed by: INTERNAL MEDICINE

## 2022-06-27 PROCEDURE — 97116 GAIT TRAINING THERAPY: CPT | Mod: GP | Performed by: PHYSICAL THERAPIST

## 2022-06-27 PROCEDURE — 97162 PT EVAL MOD COMPLEX 30 MIN: CPT | Mod: GP | Performed by: PHYSICAL THERAPIST

## 2022-06-27 PROCEDURE — 80048 BASIC METABOLIC PNL TOTAL CA: CPT | Performed by: INTERNAL MEDICINE

## 2022-06-27 PROCEDURE — 96361 HYDRATE IV INFUSION ADD-ON: CPT

## 2022-06-27 RX ORDER — AZITHROMYCIN 250 MG/1
250 TABLET, FILM COATED ORAL DAILY
Qty: 4 TABLET | Refills: 0 | Status: SHIPPED | OUTPATIENT
Start: 2022-06-28 | End: 2023-01-01

## 2022-06-27 RX ADMIN — SODIUM CHLORIDE: 9 INJECTION, SOLUTION INTRAVENOUS at 05:08

## 2022-06-27 RX ADMIN — AZITHROMYCIN MONOHYDRATE 250 MG: 250 TABLET ORAL at 10:52

## 2022-06-27 RX ADMIN — PANTOPRAZOLE SODIUM 20 MG: 20 TABLET, DELAYED RELEASE ORAL at 09:37

## 2022-06-27 RX ADMIN — METOPROLOL TARTRATE 50 MG: 25 TABLET, FILM COATED ORAL at 09:37

## 2022-06-27 RX ADMIN — APIXABAN 5 MG: 5 TABLET, FILM COATED ORAL at 09:37

## 2022-06-27 NOTE — PLAN OF CARE
Physical Therapy Discharge Summary    Reason for therapy discharge:    Discharged to home.    Progress towards therapy goal(s). See goals on Care Plan in [facility] electronic health record for goal details.  Goals partially met.  Barriers to achieving goals:   discharge on same date as initial evaluation.    Therapy recommendation(s):    Continued therapy is recommended.  Rationale/Recommendations:  Recommended home PT, pt declining at this time.     Betsy Abad, PT  6/27/2022

## 2022-06-27 NOTE — CONSULTS
Care Management Initial Consult    General Information  Assessment completed with: Patient, Children,    Type of CM/SW Visit: Initial Assessment    Primary Care Provider verified and updated as needed:     Readmission within the last 30 days:     Reason for Consult: discharge planning  Advance Care Planning:          Communication Assessment  Patient's communication style: spoken language (English or Bilingual)    Hearing Difficulty or Deaf: no   Wear Glasses or Blind: yes    Cognitive  Cognitive/Neuro/Behavioral: .WDL except  Level of Consciousness: confused, lethargic (intermittent confusion with orientation questions)  Arousal Level: opens eyes spontaneously, arouses to voice  Orientation: disoriented to, time  Mood/Behavior: flat affect, calm          Living Environment:   People in home: spouse  Eva  Current living Arrangements: assisted living      Able to return to prior arrangements: yes     Family/Social Support:  Care provided by: self, spouse/significant other  Provides care for: no one  Marital Status:   Wife, Children  Eva       Description of Support System: Supportive, Involved    Support Assessment: Adequate family and caregiver support    Current Resources:   Patient receiving home care services: No     Community Resources:    Equipment currently used at home: walker, rolling, grab bar, tub/shower, shower chair  Supplies currently used at home:      Employment/Financial:  Employment Status: retired        Financial Concerns: No concerns identified   Referral to Financial Worker: No     Lifestyle & Psychosocial Needs:  Social Determinants of Health     Tobacco Use: Not on file   Alcohol Use: Not on file   Financial Resource Strain: Not on file   Food Insecurity: Not on file   Transportation Needs: Not on file   Physical Activity: Not on file   Stress: Not on file   Social Connections: Not on file   Intimate Partner Violence: Not on file   Depression: Not on file   Housing Stability:  Not on file     Functional Status:  Prior to admission patient needed assistance:      Mental Health Status:        Chemical Dependency Status:        Values/Beliefs:  Spiritual, Cultural Beliefs, Taoist Practices, Values that affect care:               Additional Information:  Met with patient to review role of care management, progression of care and possible need for services at discharge, including OP services, home care, or skilled nursing care.   Pt states he resides at [facility] ([facility]) with his spouse, Eva. Pt states he does not receive any services from [facility] staff for himself, but his wife does.  He ambulates independently with a walker.  He states he is independent with ADLs.  He does not drive and his son, Carlos A provides transportation and helps with medication management and shopping.    Discussed recommendation for Home Care services for PT and OT and pt declines these services.  He states his son can provide transport home when ready.    11:30 AM   Received update from hospitalist that pt would like to discharge today and that pt's spouse and son are here and want to discuss home care services again.    Met with pt, spouse and son, Carlos A.  Discussed recommendation for Home PT and OT.  After lengthy conversation and encouragement from son, pt continues to decline services.  So requests writer check with [facility] to see if they will accept pt back without Home Care services.    Call placed to ZA Parisi at [facility].  Reviewed therapy recommendations and provided update from their assessment.  She states pt may return without home care services.  Ailin requests discharge orders and summary be faxed to [phone].    Met with pt, spouse and son again and provided update and they are all in agreement with discharge home at [facility] with OP f/u.    Update provided to hospitalist.    Rosibel Perrin RN

## 2022-06-27 NOTE — PROGRESS NOTES
Pt. Discharged to assisted living with his wife. Discharge paperwork reviewed with his son, who takes care of all of his medication and assists at his home when needed. Discharge medications given to son.     Huma Raya RN

## 2022-06-27 NOTE — PROVIDER NOTIFICATION
Provider Notification:    Reason for Communication:  on telemetry.HR decreased to 42 at the lowest. averaging mid to upper 40s while asleep. 50s to 60s while awake. asymptomatic.   afib with BBB and prolonged QT.   what can we put for lower limit parameters?    Team Member Name & Role:  Dr Escobar    Method of Communication:  Page    Response:  Waiting for response

## 2022-06-27 NOTE — PLAN OF CARE
PRIMARY DIAGNOSIS: GENERALIZED WEAKNESS/UTI/Pneumonia    OUTPATIENT/OBSERVATION GOALS TO BE MET BEFORE DISCHARGE  1. Orthostatic performed: N/A    2. Tolerating PO medications: Yes    3. Return to near baseline physical activity: No    4. Cleared for discharge by consultants (if involved): No    Discharge Planner Nurse   Safe discharge environment identified: Yes  Barriers to discharge: Yes       Entered by: Marilou Sauceda RN 06/27/2022 1:05 AM     Please review provider order for any additional goals.   Nurse to notify provider when observation goals have been met and patient is ready for discharge.Goal Outcome Evaluation:    Plan of Care Reviewed With: patient

## 2022-06-27 NOTE — PLAN OF CARE
Goal Outcome Evaluation:    Plan of Care Reviewed With: patient        PRIMARY DIAGNOSIS: GENERALIZED WEAKNESS/UTI/Pneumonia    OUTPATIENT/OBSERVATION GOALS TO BE MET BEFORE DISCHARGE  1. Orthostatic performed: N/A    2. Tolerating PO medications: Yes    3. Return to near baseline physical activity: No    4. Cleared for discharge by consultants (if involved): No    Discharge Planner Nurse   Safe discharge environment identified: Yes  Barriers to discharge: Yes       Entered by: Marilou Sauceda RN 06/27/2022 1:03 AM     Please review provider order for any additional goals.   Nurse to notify provider when observation goals have been met and patient is ready for discharge.

## 2022-06-27 NOTE — PLAN OF CARE
Goal Outcome Evaluation:    Plan of Care Reviewed With: patient     PRIMARY DIAGNOSIS: GENERALIZED WEAKNESS/UTI/Pneumonia    OUTPATIENT/OBSERVATION GOALS TO BE MET BEFORE DISCHARGE  1. Orthostatic performed: N/A    2. Tolerating PO medications: Yes    3. Return to near baseline physical activity: No    4. Cleared for discharge by consultants (if involved): No    Discharge Planner Nurse   Safe discharge environment identified: Yes  Barriers to discharge: Yes       Entered by: Marilou Sauceda RN 06/27/2022 5:19 AM    Barriers to discharge: PT/OT consults, receiving IV antibiotics     Please review provider order for any additional goals.   Nurse to notify provider when observation goals have been met and patient is ready for discharge.    Patient verbalizes frustration regarding hospitalization. Wants to discharge today.  S/P cath in place - malodorous urine and sediment present. Has leg bag on and refused for nursing to change to drainage bag. Dressing change to S/P cath completed - site is red and flakey.   Slept well throughout the night. Denies pain.    Reviewed plan of care.   Marilou Sauceda RN  [phone]

## 2022-06-27 NOTE — PROGRESS NOTES
[facility]      OUTPATIENT PHYSICAL THERAPY EVALUATION  PLAN OF TREATMENT FOR OUTPATIENT REHABILITATION  (COMPLETE FOR INITIAL CLAIMS ONLY)  Patient's Last Name, First Name, M.I.  YOB: 1930  Victor MDavid  SERGIO                        Provider's Name  [facility] Medical Record No.  [id number]                               Onset Date:  06/26/22   Start of Care Date:  06/27/22      Type:     _X_PT   ___OT   ___SLP Medical Diagnosis:  urinary tract infection                        PT Diagnosis:  impaired functional mobility   Visits from SOC:  1   _________________________________________________________________________________  Plan of Treatment/Functional Goals    Planned Interventions: balance training, bed mobility training, home exercise program, gait training, patient/family education, strengthening, transfer training     Goals: See Physical Therapy Goals on Care Plan in [employer] electronic health record.    Therapy Frequency: Daily  Predicted Duration of Therapy Intervention: 07/04/22  _________________________________________________________________________________    I CERTIFY THE NEED FOR THESE SERVICES FURNISHED UNDER        THIS PLAN OF TREATMENT AND WHILE UNDER MY CARE     (Physician co-signature of this document indicates review and certification of the therapy plan).              Certification date from: 06/27/22, Certification date to: 07/04/22    Referring Physician: Genevieve Cortez MD            Initial Assessment        See Physical Therapy evaluation dated 06/27/22 in Epic electronic health record.

## 2022-06-27 NOTE — PLAN OF CARE
Occupational Therapy Discharge Summary    Reason for therapy discharge:    Discharged to home with home therapy.    Progress towards therapy goal(s). See goals on Care Plan in [facility] electronic health record for goal details.  Goals partially met.  Barriers to achieving goals:   discharge from facility.    Therapy recommendation(s):    Continued therapy is recommended.  Rationale/Recommendations:  To improve overall ADL independence/safety .    Goal Outcome Evaluation:

## 2022-06-27 NOTE — PROGRESS NOTES
Occupational Therapy      06/27/22 0745   Quick Adds   Type of Visit Initial Occupational Therapy Evaluation   Living Environment   People in Home spouse   Current Living Arrangements assisted living  ([facility] assisted [facility])   Home Accessibility no concerns   Transportation Anticipated family or friend will provide   Living Environment Comments W/I shower w/ GB/SC   Self-Care   Equipment Currently Used at Home other (see comments)  (4ww)   Fall history within last six months no   Activity/Exercise/Self-Care Comment Per pt report pt is Ind. w/ all self cares- there is staff there who can assist w/ self cares if needed. Per chart review pt is spouses primary caregiver at [facility]   Instrumental Activities of Daily Living (IADL)   IADL Comments Pt stated daughter in law does some cleaning/housework- pt/spouse cook light meals but they also go to [employer] for meals too   General Information   Onset of Illness/Injury or Date of Surgery 06/26/22   Referring Physician Boby Arellano MBBS   Patient/Family Therapy Goal Statement (OT) to get home   Additional Occupational Profile Info/Pertinent History of Current Problem Chronic ind.[place]no catheter, CAP w/o SOB, chronic A-fib, HTN, presenting w/ weakness   Existing Precautions/Restrictions fall   Cognitive Status Examination   Orientation Status person   Range of Motion Comprehensive   General Range of Motion no range of motion deficits identified   Bed Mobility   Bed Mobility supine-sit;sit-supine   Supine-Sit [place] (Bed Mobility) minimum assist (75% patient effort);supervision   Sit-Supine [place] (Bed Mobility) supervision;verbal cues   Assistive Device (Bed Mobility) bed rails   Transfers   Transfers sit-stand transfer   Sit-Stand Transfer   Sit-Stand [place] (Transfers) contact guard;verbal cues;supervision   Assistive Device (Sit-Stand Transfers) walker, front-wheeled   Activities of Daily Living   BADL Assessment/Intervention upper body  dressing;lower body dressing   Upper Body Dressing Assessment/Training   Position (Upper Body Dressing) unsupported sitting   [place] Level (Upper Body Dressing) minimum assist (75% patient effort);verbal cues   Lower Body Dressing Assessment/Training   Position (Lower Body Dressing) unsupported sitting   [place] Level (Lower Body Dressing) moderate assist (50% patient effort);verbal cues   Clinical Impression   Criteria for Skilled Therapeutic Interventions Met (OT) Yes, treatment indicated   OT Diagnosis Decreased ADL ind.   OT Problem List-Impairments impacting ADL problems related to;activity tolerance impaired;mobility;cognition   Assessment of Occupational Performance 1-3 Performance Deficits   Planned Therapy Interventions (OT) ADL retraining;balance training;cognition;transfer training   Intervention Comments toileting/LB dressing, safety awareness/cognition, balance/trnf safety   Clinical Decision Making Complexity (OT) low complexity   Risk & Benefits of therapy have been explained evaluation/treatment results reviewed;patient   OT Discharge Planning   OT Discharge Recommendation (DC Rec) home with assist;home with home care occupational therapy   OT Rationale for DC Rec CGAx1 for trnf/mobility w/ fww at this time d/t decreased balance- If d/c back to [facility] OT recommending assist w/ bathing/trnfs to ensure safety upon d/c. Pt may benefit from increase in services at facility to monitior safety/ADL ind.   Total Evaluation Time (Minutes)   Total Evaluation Time (Minutes) 8   OT Goals   Therapy Frequency (OT) Daily   OT Predicted Duration/Target Date for Goal Attainment 07/06/22   OT Goals Lower Body Dressing;Transfers;Toilet Transfer/Toileting;Cognition   OT: Lower Body Dressing Modified independent;using adaptive equipment   OT: Transfer Modified independent;with assistive device   OT: Toilet Transfer/Toileting Modified independent;using adaptive equipment   OT: Cognitive Patient/caregiver will  verbalize understanding of cognitive assessment results/recommendations as needed for safe discharge planning

## 2022-06-27 NOTE — DISCHARGE SUMMARY
[facility] MEDICINE  DISCHARGE SUMMARY     Primary Care Physician: System, Provider Not In  Admission Date: 6/26/2022   Discharge Provider: ELICIA Gomes Discharge Date: 6/27/2022   Diet: As below   Code Status: No CPR- Do NOT Intubate   Activity: DCACTIVITY: Activity as tolerated        Condition at Discharge: Stable     REASON FOR PRESENTATION(See Admission Note for Details)   Weakness    PRINCIPAL & ACTIVE DISCHARGE DIAGNOSES     Active Problems:    Generalized muscle weakness    Chronic atrial fibrillation (H)    Anticoagulated by anticoagulation treatment    Suprapubic catheter (H)    HTN (hypertension)    Urinary tract infection associated with cystostomy catheter (H)    Pneumonia of both lungs due to infectious organism    Hypokalemia      PENDING LABS     Unresulted Labs Ordered in the Past 30 Days of this Admission     Date and Time Order Name Status Description    6/26/2022  1:36 PM Urine Culture Preliminary             PROCEDURES ( this hospitalization only)          RECOMMENDATIONS TO OUTPATIENT PROVIDER FOR F/U VISIT     Follow-up Appointments     Follow-up and recommended labs and tests       Follow up with primary care provider, Provider Not In System, within 7   days for hospital follow- up.                 DISPOSITION     Home. Patient declined C.    SUMMARY OF HOSPITAL COURSE:      David Dow is a 89 year old male with PMH of hypertension, chronic afib on Eliquis, supra-pubic catheter, who was brought to our ED for evaluation of weakness.      CAUTI:   - Patient has a supra-pubic catheter in place. Per patient recently exchanged  - Abnormal UA noted.   - Ucx pending   - Treated with IV Rocephin while here.     CAP   - Treated with IV Ceftriaxone and azithromycin while here  - Patient feeling better. Insisted on going home ([facility]) today itself. Spouse and son were at bedside.   - Discharging home on PO augmentin and zithromax     Chronic afib  -  Continue metoprolol with hold parameters   - Continue eliquis      HTN  - Metoprolol as above      Hypokalemia  - Replaced     Generalized weakness  - PT/OT   - Patient declined [facility].    Discharge Medications with Med changes:     Discharge Medication List as of 6/27/2022  1:03 PM      START taking these medications    Details   amoxicillin-clavulanate (AUGMENTIN) 875-125 MG tablet Take 1 tablet by mouth 2 times daily for 7 days, Disp-14 tablet, R-0, E-Prescribe      azithromycin (ZITHROMAX) 250 MG tablet Take 1 tablet (250 mg) by mouth daily, Disp-4 tablet, R-0, E-Prescribe         CONTINUE these medications which have NOT CHANGED    Details   apixaban ANTICOAGULANT (ELIQUIS) 5 MG tablet Take 5 mg by mouth 2 times daily, Historical      furosemide (LASIX) 40 MG tablet Take 40 mg by mouth daily, Historical      metoprolol tartrate (LOPRESSOR) 50 MG tablet Take 50 mg by mouth 2 times daily, Historical      pantoprazole (PROTONIX) 20 MG EC tablet Take 20 mg by mouth daily, Historical      potassium chloride ER (MICRO-K) 10 MEQ CR capsule Take 10 mEq by mouth daily, Historical      vitamin D3 (CHOLECALCIFEROL) 50 mcg (2000 units) tablet Take 1 tablet by mouth daily, Historical                   Rationale for medication changes:      Please see the summary above        Consults   None      Immunizations given this encounter       There is no immunization history on file for this patient.        Anticoagulation Information      Recent INR results: No results for input(s): INR in the last 168 hours.  Warfarin doses (if applicable) or name of other anticoagulant: Eliquis      SIGNIFICANT IMAGING FINDINGS     Results for orders placed or performed during the hospital encounter of 06/26/22   Chest XR,  PA & LAT    Impression    IMPRESSION: Cardiomegaly with normal vascularity. Patchy bibasilar airspace opacity/pneumonia. No pleural effusion or pneumothorax. 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   Head CT w/o contrast    Impression    IMPRESSION:  1.  No CT evidence for acute intracranial process.  2.  Brain atrophy and presumed chronic microvascular ischemic changes as above.       SIGNIFICANT LABORATORY FINDINGS     Most Recent 3 CBC's:Recent Labs   Lab Test 06/27/22  0618 06/26/22  1235 06/15/22  0715   WBC 7.4 10.6 5.1   HGB 11.9* 14.1 13.4   MCV 89 87 87   * 157 142*     Most Recent 3 BMP's:Recent Labs   Lab Test 06/27/22  0618 06/26/22  1925 06/26/22  1235 06/17/22  0738 06/15/22  0715     --  141  --  142   POTASSIUM 4.2 3.5 3.4*   < > 3.1*   CHLORIDE 106  --  102  --  101   CO2 28  --  29  --  29   BUN 20  --  22  --  23   CR 1.17  --  1.14  --  0.81   ANIONGAP 9  --  10  --  12   JOSESITO 8.6  --  9.4  --  9.4   GLC 83  --  106  --  90    < > = values in this interval not displayed.     Most Recent 2 LFT's:Recent Labs   Lab Test 06/26/22  1235   AST 17   ALT 11   ALKPHOS 80   BILITOTAL 1.7*         Discharge Orders        Reason for your hospital stay    Weakness     Follow-up and recommended labs and tests     Follow up with primary care provider, Provider Not In System, within 7 days for hospital follow- up.     Activity    Your activity upon discharge: activity as tolerated     Diet    Follow this diet upon discharge: Orders Placed This Encounter      Low Saturated Fat Na <2400 mg       Examination   /68 (BP Location: Left arm)   Pulse 57   Temp 98.2  F (36.8  C) (Oral)   Resp 20   Ht 1.651 m (5' 5\")   Wt 61.2 kg (135 lb)   SpO2 95%   BMI 22.47 kg/m        General: Not in obvious distress.  HEENT: NC, AT   Chest: Clear to auscultation bilaterally  Heart: S1S2 normal, regular. No M/R/G  Abdomen: Soft. NT, ND. Bowel sounds- active. Supra-pubic in place.  Extremities: No legs swelling  Neuro: Awake, grossly non-focal      Please see EMR for more detailed significant labs, imaging, consultant notes etc.    IBoby MBBS, personally saw the patient " today and spent greater than 30 minutes discharging this patient.    ELICIA Gomes  [facility]    CC:System, Provider Not In

## 2022-06-27 NOTE — PROGRESS NOTES
06/27/22 0845   Quick Adds   Quick Adds Student Supervision-Eval Only;Certification   Type of Visit Initial PT Evaluation   Student Supervision-Eval Only    Student Supervision Therapy services provided with the co-signing licensed therapist guiding and directing the services, and providing the skilled judgement and assessment throughout the session;Direct patient contact provided;Direct supervision provided;Line of sight supervision provided   Living Environment   People in Home spouse   Current Living Arrangements assisted living   Home Accessibility no concerns   Transportation Anticipated family or friend will provide   Self-Care   Equipment Currently Used at Home walker, rolling  (4WW)   Fall history within last six months no   Activity/Exercise/Self-Care Comment Per pt report, pt is ind. w/ all ADLs and mobility. Staff around to assist if needed.   General Information   Onset of Illness/Injury or Date of Surgery 06/26/22   Referring Physician Genevieve Cortez MD   Patient/Family Therapy Goals Statement (PT) Return to assisted living   Pertinent History of Current Problem (include personal factors and/or comorbidities that impact the POC) h/o generalized weakness   Existing Precautions/Restrictions no known precautions/restrictions   Range of Motion (ROM)   Range of Motion ROM is WFL   Strength (Manual Muscle Testing)   Strength (Manual Muscle Testing) No deficits observed during functional mobility   Bed Mobility   Bed Mobility rolling right;supine-sit   Rolling Right [place] (Bed Mobility) nonverbal cues (demo/gesture);minimum assist (75% patient effort);verbal cues   Supine-Sit [place] (Bed Mobility) minimum assist (75% patient effort);moderate assist (50% patient effort);verbal cues;nonverbal cues (demo/gesture)   Bed Mobility Limitations impaired ability to control trunk for mobility   Impairments Contributing to Impaired Bed Mobility impaired coordination;decreased strength   Assistive  Device (Bed Mobility) bed rails   Transfers   Transfers sit-stand transfer   Transfer Safety Concerns Noted decreased step length;decreased weight-shifting ability   Impairments Contributing to Impaired Transfers impaired coordination;decreased strength   Sit-Stand Transfer   Sit-Stand [place] (Transfers) contact guard;verbal cues   Assistive Device (Sit-Stand Transfers) walker, front-wheeled   Gait/Stairs (Locomotion)   [place] Level (Gait) contact guard;verbal cues   Assistive Device (Gait) walker, front-wheeled   Distance in Feet (Required for LE Total Joints) 30'   Pattern (Gait) step-through   Deviations/Abnormal Patterns (Gait) jade decreased;weight shifting decreased;stride length decreased   Comment, (Gait/Stairs) no stairs at baseline   Clinical Impression   Criteria for Skilled Therapeutic Intervention Yes, treatment indicated   PT Diagnosis (PT) impaired functional mobility   Influenced by the following impairments decreased strength, balance, and impaired posture   Functional limitations due to impairments transfers, ambulation   Clinical Presentation (PT Evaluation Complexity) Stable/Uncomplicated   Clinical Presentation Rationale pt presents as medically diagnosed   Clinical Decision Making (Complexity) low complexity   Planned Therapy Interventions (PT) balance training;bed mobility training;home exercise program;gait training;patient/family education;strengthening;transfer training   Anticipated Equipment Needs at Discharge (PT) walker, rolling   Risk & Benefits of therapy have been explained evaluation/treatment results reviewed;participants voiced agreement with care plan;participants included;patient   PT Discharge Planning   PT Discharge Recommendation (DC Rec) home;home with assist;home with home care physical therapy   PT Rationale for DC Rec Pt lives in assisted living at baseline. Able to get assistance if needed. CGA-min A w/ mobility.   Plan of Care Review   Plan of Care  Reviewed With patient   Therapy Certification   Start of care date 06/27/22   Certification date from 06/27/22   Certification date to 07/04/22   Medical Diagnosis urinary tract infection   Total Evaluation Time   Total Evaluation Time (Minutes) 8   Physical Therapy Goals   PT Frequency Daily   PT Predicted Duration/Target Date for Goal Attainment 07/04/22   PT Goals Bed Mobility;Transfers;Gait   PT: Bed Mobility Modified independent;Supervision/stand-by assist;Supine to/from sit;Rolling   PT: Transfers Modified independent;Bed to/from chair;Assistive device  (rolling walker)   PT: Gait Supervision/stand-by assist;Rolling walker;Greater than 200 feet;Assistive device

## 2022-06-27 NOTE — PROGRESS NOTES
Care Management Discharge Note    Discharge Date: 06/27/2022     Discharge Disposition: Assisted Living    Discharge Services:  OP f/u with PCP    Discharge DME: None    Discharge Transportation: [facility] A fischer, will provide    Private pay costs discussed: Not applicable    PAS Confirmation Code:  Not applicable  Patient/family educated on Medicare website which has current facility and service quality ratings:      Education Provided on the Discharge Plan: Yes   Persons Notified of Discharge Plans: pt, spouse and Carlos A fischer  Patient/Family in Agreement with the Plan: yes    Handoff Referral Completed: Yes    Additional Information:  Pt declines Home Care services.  Plan to discharge home to [facility] today with OP f/u with PCP.  [facility] will fax discharge orders.   Carlos A Fischer, will transport pt home.    Rosibel Perrin RN

## 2022-06-28 ENCOUNTER — PATIENT OUTREACH (OUTPATIENT)
Dept: CARE COORDINATION | Facility: CLINIC | Age: 87
End: 2022-06-28

## 2022-06-28 DIAGNOSIS — Z71.89 OTHER SPECIFIED COUNSELING: ICD-10-CM

## 2022-06-28 LAB — BACTERIA UR CULT: ABNORMAL

## 2022-06-28 NOTE — PROGRESS NOTES
Clinic Care Coordination Contact  [facility]: Post-Discharge Note  SITUATION                                                      Admission:    Admission Date: 06/26/22   Reason for Admission: Weakness  Discharge:   Discharge Date: 06/27/22  Discharge Diagnosis: Weakness    BACKGROUND                                                      Per hospital discharge summary and inpatient provider notes:    David Dow is a 89 year old old male with h/o generalized weakness.     Per patient's son, patient woke up this morning feeling more tired than usual despite sleeping fine last night. Patient usually sleeps in a recliner and does not use his CPAP machine while doing so. Patient's wife helped patient to the bathroom with a walker with a seat to have a bowel movement. Afterwards, wife was able to get patient back to his recliner but not into it. He got stuck in a slumped over position due to weakness not loss of consciousness. The assisted living contacted his son, who came with his wife to check on the patient. Nursing aids were contacted to help move him but they were unable to. Family was able to move patient into his recliner, however, this was painful for the pain. Son called nonemergent EMS who checked patient but decided he should be brought to the ED.     ASSESSMENT      Enrollment  Primary Care Care Coordination Status: Not a Candidate    Discharge Assessment  How are you doing now that you are home?: I feel fine.  How are your symptoms? (Red Flag symptoms escalate to triage hotline per guidelines): Improved  Do you feel your condition is stable enough to be safe at home until your provider visit?: Yes  Does the patient have their discharge instructions? : Yes  Does the patient have questions regarding their discharge instructions? : No  Were you started on any new medications or were there changes to any of your previous medications? : Yes  Does the patient have all of their medications?: Yes  Do you  have questions regarding any of your medications? : No  Discharge follow-up appointment scheduled within 14 calendar days? : No  Is patient agreeable to assistance with scheduling? : No                  PLAN                                                      Outpatient Plan: Follow up with primary care provider, Provider Not In System, within 7  days for hospital follow- up.    No future appointments.      For any urgent concerns, please contact our 24 hour nurse triage line: [phone])         WINNIE Wang  [phone]  [facility]

## 2022-07-01 ENCOUNTER — DOCUMENTATION ONLY (OUTPATIENT)
Dept: OTHER | Facility: CLINIC | Age: 87
End: 2022-07-01

## 2022-07-05 ENCOUNTER — LAB REQUISITION (OUTPATIENT)
Dept: LAB | Facility: CLINIC | Age: 87
End: 2022-07-05
Payer: MEDICARE

## 2022-07-05 DIAGNOSIS — I10 ESSENTIAL (PRIMARY) HYPERTENSION: ICD-10-CM

## 2022-07-05 DIAGNOSIS — D64.9 ANEMIA, UNSPECIFIED: ICD-10-CM

## 2022-07-06 LAB
ANION GAP SERPL CALCULATED.3IONS-SCNC: 12 MMOL/L (ref 7–15)
BASOPHILS # BLD AUTO: 0 10E3/UL (ref 0–0.2)
BASOPHILS NFR BLD AUTO: 0 %
BUN SERPL-MCNC: 20.6 MG/DL (ref 8–23)
CALCIUM SERPL-MCNC: 9 MG/DL (ref 8.2–9.6)
CHLORIDE SERPL-SCNC: 102 MMOL/L (ref 98–107)
CREAT SERPL-MCNC: 0.98 MG/DL (ref 0.67–1.17)
DEPRECATED HCO3 PLAS-SCNC: 27 MMOL/L (ref 22–29)
EOSINOPHIL # BLD AUTO: 0 10E3/UL (ref 0–0.7)
EOSINOPHIL NFR BLD AUTO: 0 %
ERYTHROCYTE [DISTWIDTH] IN BLOOD BY AUTOMATED COUNT: 15.1 % (ref 10–15)
GFR SERPL CREATININE-BSD FRML MDRD: 73 ML/MIN/1.73M2
GLUCOSE SERPL-MCNC: 87 MG/DL (ref 70–99)
HCT VFR BLD AUTO: 40.3 % (ref 40–53)
HGB BLD-MCNC: 13 G/DL (ref 13.3–17.7)
IMM GRANULOCYTES # BLD: 0 10E3/UL
IMM GRANULOCYTES NFR BLD: 1 %
LYMPHOCYTES # BLD AUTO: 1.4 10E3/UL (ref 0.8–5.3)
LYMPHOCYTES NFR BLD AUTO: 30 %
MCH RBC QN AUTO: 28.3 PG (ref 26.5–33)
MCHC RBC AUTO-ENTMCNC: 32.3 G/DL (ref 31.5–36.5)
MCV RBC AUTO: 88 FL (ref 78–100)
MONOCYTES # BLD AUTO: 0.7 10E3/UL (ref 0–1.3)
MONOCYTES NFR BLD AUTO: 14 %
NEUTROPHILS # BLD AUTO: 2.6 10E3/UL (ref 1.6–8.3)
NEUTROPHILS NFR BLD AUTO: 55 %
NRBC # BLD AUTO: 0 10E3/UL
NRBC BLD AUTO-RTO: 0 /100
PLATELET # BLD AUTO: 149 10E3/UL (ref 150–450)
POTASSIUM SERPL-SCNC: 3.6 MMOL/L (ref 3.4–5.3)
RBC # BLD AUTO: 4.6 10E6/UL (ref 4.4–5.9)
SODIUM SERPL-SCNC: 141 MMOL/L (ref 136–145)
WBC # BLD AUTO: 4.7 10E3/UL (ref 4–11)

## 2022-07-06 PROCEDURE — 80048 BASIC METABOLIC PNL TOTAL CA: CPT | Mod: ORL | Performed by: NURSE PRACTITIONER

## 2022-07-06 PROCEDURE — 85025 COMPLETE CBC W/AUTO DIFF WBC: CPT | Mod: ORL | Performed by: NURSE PRACTITIONER

## 2022-07-06 PROCEDURE — P9604 ONE-WAY ALLOW PRORATED TRIP: HCPCS | Mod: ORL | Performed by: NURSE PRACTITIONER

## 2022-07-06 PROCEDURE — 36415 COLL VENOUS BLD VENIPUNCTURE: CPT | Mod: ORL | Performed by: NURSE PRACTITIONER

## 2023-01-01 ENCOUNTER — APPOINTMENT (OUTPATIENT)
Dept: PHYSICAL THERAPY | Facility: HOSPITAL | Age: 88
DRG: 280 | End: 2023-01-01
Payer: MEDICARE

## 2023-01-01 ENCOUNTER — ANESTHESIA EVENT (OUTPATIENT)
Dept: MEDSURG UNIT | Facility: HOSPITAL | Age: 88
DRG: 280 | End: 2023-01-01
Payer: MEDICARE

## 2023-01-01 ENCOUNTER — LAB REQUISITION (OUTPATIENT)
Dept: LAB | Facility: CLINIC | Age: 88
End: 2023-01-01
Payer: MEDICARE

## 2023-01-01 ENCOUNTER — APPOINTMENT (OUTPATIENT)
Dept: PHYSICAL THERAPY | Facility: HOSPITAL | Age: 88
DRG: 280 | End: 2023-01-01
Attending: INTERNAL MEDICINE
Payer: MEDICARE

## 2023-01-01 ENCOUNTER — ANESTHESIA (OUTPATIENT)
Dept: MEDSURG UNIT | Facility: HOSPITAL | Age: 88
DRG: 280 | End: 2023-01-01
Payer: MEDICARE

## 2023-01-01 ENCOUNTER — APPOINTMENT (OUTPATIENT)
Dept: OCCUPATIONAL THERAPY | Facility: HOSPITAL | Age: 88
DRG: 280 | End: 2023-01-01
Attending: INTERNAL MEDICINE
Payer: MEDICARE

## 2023-01-01 ENCOUNTER — APPOINTMENT (OUTPATIENT)
Dept: CARDIOLOGY | Facility: HOSPITAL | Age: 88
DRG: 280 | End: 2023-01-01
Attending: INTERNAL MEDICINE
Payer: MEDICARE

## 2023-01-01 ENCOUNTER — APPOINTMENT (OUTPATIENT)
Dept: SPEECH THERAPY | Facility: HOSPITAL | Age: 88
DRG: 280 | End: 2023-01-01
Attending: INTERNAL MEDICINE
Payer: MEDICARE

## 2023-01-01 ENCOUNTER — APPOINTMENT (OUTPATIENT)
Dept: OCCUPATIONAL THERAPY | Facility: HOSPITAL | Age: 88
DRG: 280 | End: 2023-01-01
Payer: MEDICARE

## 2023-01-01 ENCOUNTER — APPOINTMENT (OUTPATIENT)
Dept: RADIOLOGY | Facility: HOSPITAL | Age: 88
DRG: 280 | End: 2023-01-01
Attending: STUDENT IN AN ORGANIZED HEALTH CARE EDUCATION/TRAINING PROGRAM
Payer: MEDICARE

## 2023-01-01 ENCOUNTER — MEDICAL CORRESPONDENCE (OUTPATIENT)
Dept: HEALTH INFORMATION MANAGEMENT | Facility: CLINIC | Age: 88
End: 2023-01-01

## 2023-01-01 ENCOUNTER — HOSPITAL ENCOUNTER (INPATIENT)
Facility: HOSPITAL | Age: 88
LOS: 5 days | Discharge: NURSING, REHAB OR LONG-TERM CARE | DRG: 280 | End: 2023-06-26
Attending: STUDENT IN AN ORGANIZED HEALTH CARE EDUCATION/TRAINING PROGRAM | Admitting: INTERNAL MEDICINE
Payer: MEDICARE

## 2023-01-01 ENCOUNTER — DOCUMENTATION ONLY (OUTPATIENT)
Dept: OTHER | Facility: CLINIC | Age: 88
End: 2023-01-01
Payer: MEDICARE

## 2023-01-01 VITALS
SYSTOLIC BLOOD PRESSURE: 125 MMHG | DIASTOLIC BLOOD PRESSURE: 68 MMHG | RESPIRATION RATE: 20 BRPM | TEMPERATURE: 98.2 F | OXYGEN SATURATION: 93 % | HEART RATE: 61 BPM

## 2023-01-01 DIAGNOSIS — N17.0 ACUTE KIDNEY FAILURE WITH TUBULAR NECROSIS (H): ICD-10-CM

## 2023-01-01 DIAGNOSIS — R07.9 CHEST PAIN, UNSPECIFIED TYPE: ICD-10-CM

## 2023-01-01 DIAGNOSIS — I50.22 CHRONIC SYSTOLIC (CONGESTIVE) HEART FAILURE (H): ICD-10-CM

## 2023-01-01 DIAGNOSIS — R07.2 PRECORDIAL PAIN: ICD-10-CM

## 2023-01-01 DIAGNOSIS — N18.9 CHRONIC KIDNEY DISEASE, UNSPECIFIED: ICD-10-CM

## 2023-01-01 DIAGNOSIS — E87.0 HYPEROSMOLALITY AND HYPERNATREMIA: ICD-10-CM

## 2023-01-01 DIAGNOSIS — J69.0 ASPIRATION PNEUMONITIS (H): Primary | ICD-10-CM

## 2023-01-01 DIAGNOSIS — R21 RASH: ICD-10-CM

## 2023-01-01 LAB
ALBUMIN SERPL BCG-MCNC: 3.8 G/DL (ref 3.5–5.2)
ALBUMIN SERPL BCG-MCNC: 4.1 G/DL (ref 3.5–5.2)
ALP SERPL-CCNC: 70 U/L (ref 40–129)
ALP SERPL-CCNC: 76 U/L (ref 40–129)
ALT SERPL W P-5'-P-CCNC: 10 U/L (ref 0–70)
ALT SERPL W P-5'-P-CCNC: 7 U/L (ref 0–70)
ANION GAP SERPL CALCULATED.3IONS-SCNC: 10 MMOL/L (ref 7–15)
ANION GAP SERPL CALCULATED.3IONS-SCNC: 11 MMOL/L (ref 7–15)
ANION GAP SERPL CALCULATED.3IONS-SCNC: 12 MMOL/L (ref 7–15)
ANION GAP SERPL CALCULATED.3IONS-SCNC: 14 MMOL/L (ref 7–15)
ANION GAP SERPL CALCULATED.3IONS-SCNC: 17 MMOL/L (ref 7–15)
AST SERPL W P-5'-P-CCNC: 15 U/L (ref 0–45)
AST SERPL W P-5'-P-CCNC: 35 U/L (ref 0–45)
ATRIAL RATE - MUSE: 66 BPM
ATRIAL RATE - MUSE: 76 BPM
BACTERIA BLD CULT: NO GROWTH
BACTERIA BLD CULT: NO GROWTH
BACTERIA SPT CULT: NORMAL
BASE EXCESS BLDV CALC-SCNC: 4.5 MMOL/L
BASOPHILS # BLD AUTO: 0 10E3/UL (ref 0–0.2)
BASOPHILS NFR BLD AUTO: 0 %
BILIRUB SERPL-MCNC: 0.9 MG/DL
BILIRUB SERPL-MCNC: 1.2 MG/DL
BUN SERPL-MCNC: 23 MG/DL (ref 8–23)
BUN SERPL-MCNC: 29.6 MG/DL (ref 8–23)
BUN SERPL-MCNC: 39.4 MG/DL (ref 8–23)
BUN SERPL-MCNC: 42.1 MG/DL (ref 8–23)
BUN SERPL-MCNC: 44.2 MG/DL (ref 8–23)
BUN SERPL-MCNC: 44.4 MG/DL (ref 8–23)
BUN SERPL-MCNC: 47.9 MG/DL (ref 8–23)
CALCIUM SERPL-MCNC: 8.7 MG/DL (ref 8.2–9.6)
CALCIUM SERPL-MCNC: 8.9 MG/DL (ref 8.2–9.6)
CALCIUM SERPL-MCNC: 8.9 MG/DL (ref 8.2–9.6)
CALCIUM SERPL-MCNC: 9.1 MG/DL (ref 8.2–9.6)
CALCIUM SERPL-MCNC: 9.1 MG/DL (ref 8.2–9.6)
CALCIUM SERPL-MCNC: 9.3 MG/DL (ref 8.2–9.6)
CALCIUM SERPL-MCNC: 9.4 MG/DL (ref 8.2–9.6)
CHLORIDE SERPL-SCNC: 102 MMOL/L (ref 98–107)
CHLORIDE SERPL-SCNC: 103 MMOL/L (ref 98–107)
CHLORIDE SERPL-SCNC: 103 MMOL/L (ref 98–107)
CHLORIDE SERPL-SCNC: 104 MMOL/L (ref 98–107)
CHLORIDE SERPL-SCNC: 107 MMOL/L (ref 98–107)
CHOLEST SERPL-MCNC: 100 MG/DL
CREAT SERPL-MCNC: 1.02 MG/DL (ref 0.67–1.17)
CREAT SERPL-MCNC: 1.26 MG/DL (ref 0.67–1.17)
CREAT SERPL-MCNC: 1.34 MG/DL (ref 0.67–1.17)
CREAT SERPL-MCNC: 1.37 MG/DL (ref 0.67–1.17)
CREAT SERPL-MCNC: 1.49 MG/DL (ref 0.67–1.17)
CREAT SERPL-MCNC: 1.52 MG/DL (ref 0.67–1.17)
CREAT SERPL-MCNC: 1.65 MG/DL (ref 0.67–1.17)
DAT POLY: NEGATIVE
DEPRECATED HCO3 PLAS-SCNC: 27 MMOL/L (ref 22–29)
DEPRECATED HCO3 PLAS-SCNC: 27 MMOL/L (ref 22–29)
DEPRECATED HCO3 PLAS-SCNC: 28 MMOL/L (ref 22–29)
DEPRECATED HCO3 PLAS-SCNC: 29 MMOL/L (ref 22–29)
DEPRECATED HCO3 PLAS-SCNC: 31 MMOL/L (ref 22–29)
DIASTOLIC BLOOD PRESSURE - MUSE: 98 MMHG
DIASTOLIC BLOOD PRESSURE - MUSE: NORMAL MMHG
EOSINOPHIL # BLD AUTO: 0 10E3/UL (ref 0–0.7)
EOSINOPHIL NFR BLD AUTO: 0 %
ERYTHROCYTE [DISTWIDTH] IN BLOOD BY AUTOMATED COUNT: 16.7 % (ref 10–15)
ERYTHROCYTE [DISTWIDTH] IN BLOOD BY AUTOMATED COUNT: 16.7 % (ref 10–15)
ERYTHROCYTE [DISTWIDTH] IN BLOOD BY AUTOMATED COUNT: 17.2 % (ref 10–15)
ERYTHROCYTE [DISTWIDTH] IN BLOOD BY AUTOMATED COUNT: 21 % (ref 10–15)
ERYTHROCYTE [DISTWIDTH] IN BLOOD BY AUTOMATED COUNT: 21.7 % (ref 10–15)
FERRITIN SERPL-MCNC: 18 NG/ML (ref 31–409)
GFR SERPL CREATININE-BSD FRML MDRD: 39 ML/MIN/1.73M2
GFR SERPL CREATININE-BSD FRML MDRD: 43 ML/MIN/1.73M2
GFR SERPL CREATININE-BSD FRML MDRD: 44 ML/MIN/1.73M2
GFR SERPL CREATININE-BSD FRML MDRD: 48 ML/MIN/1.73M2
GFR SERPL CREATININE-BSD FRML MDRD: 50 ML/MIN/1.73M2
GFR SERPL CREATININE-BSD FRML MDRD: 54 ML/MIN/1.73M2
GFR SERPL CREATININE-BSD FRML MDRD: 69 ML/MIN/1.73M2
GLUCOSE BLDC GLUCOMTR-MCNC: 103 MG/DL (ref 70–99)
GLUCOSE BLDC GLUCOMTR-MCNC: 104 MG/DL (ref 70–99)
GLUCOSE BLDC GLUCOMTR-MCNC: 110 MG/DL (ref 70–99)
GLUCOSE BLDC GLUCOMTR-MCNC: 112 MG/DL (ref 70–99)
GLUCOSE BLDC GLUCOMTR-MCNC: 122 MG/DL (ref 70–99)
GLUCOSE BLDC GLUCOMTR-MCNC: 127 MG/DL (ref 70–99)
GLUCOSE BLDC GLUCOMTR-MCNC: 128 MG/DL (ref 70–99)
GLUCOSE BLDC GLUCOMTR-MCNC: 130 MG/DL (ref 70–99)
GLUCOSE BLDC GLUCOMTR-MCNC: 135 MG/DL (ref 70–99)
GLUCOSE BLDC GLUCOMTR-MCNC: 138 MG/DL (ref 70–99)
GLUCOSE BLDC GLUCOMTR-MCNC: 159 MG/DL (ref 70–99)
GLUCOSE BLDC GLUCOMTR-MCNC: 188 MG/DL (ref 70–99)
GLUCOSE BLDC GLUCOMTR-MCNC: 198 MG/DL (ref 70–99)
GLUCOSE BLDC GLUCOMTR-MCNC: 96 MG/DL (ref 70–99)
GLUCOSE SERPL-MCNC: 100 MG/DL (ref 70–99)
GLUCOSE SERPL-MCNC: 101 MG/DL (ref 70–99)
GLUCOSE SERPL-MCNC: 114 MG/DL (ref 70–99)
GLUCOSE SERPL-MCNC: 124 MG/DL (ref 70–99)
GLUCOSE SERPL-MCNC: 183 MG/DL (ref 70–99)
GLUCOSE SERPL-MCNC: 98 MG/DL (ref 70–99)
GLUCOSE SERPL-MCNC: 98 MG/DL (ref 70–99)
GRAM STAIN RESULT: NORMAL
HAPTOGLOB SERPL-MCNC: 185 MG/DL (ref 32–197)
HBA1C MFR BLD: 6 %
HCO3 BLDV-SCNC: 30 MMOL/L (ref 24–30)
HCT VFR BLD AUTO: 30.1 % (ref 40–53)
HCT VFR BLD AUTO: 31.7 % (ref 40–53)
HCT VFR BLD AUTO: 31.8 % (ref 40–53)
HCT VFR BLD AUTO: 35.7 % (ref 40–53)
HCT VFR BLD AUTO: 42.7 % (ref 40–53)
HDLC SERPL-MCNC: 34 MG/DL
HGB BLD-MCNC: 10 G/DL (ref 13.3–17.7)
HGB BLD-MCNC: 11.6 G/DL (ref 13.3–17.7)
HGB BLD-MCNC: 8.9 G/DL (ref 13.3–17.7)
HGB BLD-MCNC: 9 G/DL (ref 13.3–17.7)
HGB BLD-MCNC: 9.2 G/DL (ref 13.3–17.7)
HGB BLD-MCNC: 9.3 G/DL (ref 13.3–17.7)
HGB BLD-MCNC: 9.4 G/DL (ref 13.3–17.7)
HGB BLD-MCNC: 9.4 G/DL (ref 13.3–17.7)
HGB BLD-MCNC: 9.8 G/DL (ref 13.3–17.7)
HOLD SPECIMEN: NORMAL
HOLD SPECIMEN: NORMAL
IMM GRANULOCYTES # BLD: 0.1 10E3/UL
IMM GRANULOCYTES # BLD: 0.1 10E3/UL
IMM GRANULOCYTES # BLD: 0.3 10E3/UL
IMM GRANULOCYTES NFR BLD: 1 %
INTERPRETATION ECG - MUSE: NORMAL
INTERPRETATION ECG - MUSE: NORMAL
IRON BINDING CAPACITY (ROCHE): 389 UG/DL (ref 240–430)
IRON SATN MFR SERPL: 8 % (ref 15–46)
IRON SERPL-MCNC: 31 UG/DL (ref 61–157)
LACTATE SERPL-SCNC: 2.4 MMOL/L (ref 0.7–2)
LACTATE SERPL-SCNC: 4.8 MMOL/L (ref 0.7–2)
LDH SERPL L TO P-CCNC: 176 U/L (ref 0–250)
LDLC SERPL CALC-MCNC: 55 MG/DL
LVEF ECHO: NORMAL
LYMPHOCYTES # BLD AUTO: 0.6 10E3/UL (ref 0.8–5.3)
LYMPHOCYTES # BLD AUTO: 0.9 10E3/UL (ref 0.8–5.3)
LYMPHOCYTES # BLD AUTO: 1.3 10E3/UL (ref 0.8–5.3)
LYMPHOCYTES NFR BLD AUTO: 12 %
LYMPHOCYTES NFR BLD AUTO: 20 %
LYMPHOCYTES NFR BLD AUTO: 3 %
MAGNESIUM SERPL-MCNC: 2 MG/DL (ref 1.7–2.3)
MCH RBC QN AUTO: 23.5 PG (ref 26.5–33)
MCH RBC QN AUTO: 23.5 PG (ref 26.5–33)
MCH RBC QN AUTO: 23.6 PG (ref 26.5–33)
MCH RBC QN AUTO: 23.8 PG (ref 26.5–33)
MCH RBC QN AUTO: 23.8 PG (ref 26.5–33)
MCHC RBC AUTO-ENTMCNC: 27.2 G/DL (ref 31.5–36.5)
MCHC RBC AUTO-ENTMCNC: 28 G/DL (ref 31.5–36.5)
MCHC RBC AUTO-ENTMCNC: 29.3 G/DL (ref 31.5–36.5)
MCHC RBC AUTO-ENTMCNC: 29.6 G/DL (ref 31.5–36.5)
MCHC RBC AUTO-ENTMCNC: 29.6 G/DL (ref 31.5–36.5)
MCV RBC AUTO: 80 FL (ref 78–100)
MCV RBC AUTO: 80 FL (ref 78–100)
MCV RBC AUTO: 81 FL (ref 78–100)
MCV RBC AUTO: 85 FL (ref 78–100)
MCV RBC AUTO: 87 FL (ref 78–100)
MONOCYTES # BLD AUTO: 0.8 10E3/UL (ref 0–1.3)
MONOCYTES # BLD AUTO: 1.3 10E3/UL (ref 0–1.3)
MONOCYTES # BLD AUTO: 2.6 10E3/UL (ref 0–1.3)
MONOCYTES NFR BLD AUTO: 11 %
MONOCYTES NFR BLD AUTO: 13 %
MONOCYTES NFR BLD AUTO: 18 %
MRSA DNA SPEC QL NAA+PROBE: NEGATIVE
NEUTROPHILS # BLD AUTO: 19.7 10E3/UL (ref 1.6–8.3)
NEUTROPHILS # BLD AUTO: 4.2 10E3/UL (ref 1.6–8.3)
NEUTROPHILS # BLD AUTO: 5.1 10E3/UL (ref 1.6–8.3)
NEUTROPHILS NFR BLD AUTO: 66 %
NEUTROPHILS NFR BLD AUTO: 69 %
NEUTROPHILS NFR BLD AUTO: 85 %
NONHDLC SERPL-MCNC: 66 MG/DL
NRBC # BLD AUTO: 0 10E3/UL
NRBC # BLD AUTO: 0 10E3/UL
NRBC # BLD AUTO: 0.1 10E3/UL
NRBC BLD AUTO-RTO: 0 /100
NRBC BLD AUTO-RTO: 0 /100
NRBC BLD AUTO-RTO: 1 /100
NT-PROBNP SERPL-MCNC: 3514 PG/ML (ref 0–1800)
NT-PROBNP SERPL-MCNC: ABNORMAL PG/ML (ref 0–1800)
OXYHGB MFR BLDV: 66.6 % (ref 70–75)
P AXIS - MUSE: NORMAL DEGREES
P AXIS - MUSE: NORMAL DEGREES
PCO2 BLDV: 51 MM HG (ref 35–50)
PH BLDV: 7.38 [PH] (ref 7.35–7.45)
PLATELET # BLD AUTO: 126 10E3/UL (ref 150–450)
PLATELET # BLD AUTO: 139 10E3/UL (ref 150–450)
PLATELET # BLD AUTO: 159 10E3/UL (ref 150–450)
PLATELET # BLD AUTO: 160 10E3/UL (ref 150–450)
PLATELET # BLD AUTO: 191 10E3/UL (ref 150–450)
PO2 BLDV: 41 MM HG (ref 25–47)
POTASSIUM SERPL-SCNC: 3.8 MMOL/L (ref 3.4–5.3)
POTASSIUM SERPL-SCNC: 4.3 MMOL/L (ref 3.4–5.3)
POTASSIUM SERPL-SCNC: 4.6 MMOL/L (ref 3.4–5.3)
POTASSIUM SERPL-SCNC: 4.6 MMOL/L (ref 3.4–5.3)
POTASSIUM SERPL-SCNC: 4.8 MMOL/L (ref 3.4–5.3)
POTASSIUM SERPL-SCNC: 4.9 MMOL/L (ref 3.4–5.3)
POTASSIUM SERPL-SCNC: 5.3 MMOL/L (ref 3.4–5.3)
PR INTERVAL - MUSE: NORMAL MS
PR INTERVAL - MUSE: NORMAL MS
PROCALCITONIN SERPL IA-MCNC: 0.07 NG/ML
PROT SERPL-MCNC: 6.4 G/DL (ref 6.4–8.3)
PROT SERPL-MCNC: 6.7 G/DL (ref 6.4–8.3)
QRS DURATION - MUSE: 140 MS
QRS DURATION - MUSE: 144 MS
QT - MUSE: 380 MS
QT - MUSE: 406 MS
QTC - MUSE: 488 MS
QTC - MUSE: 492 MS
R AXIS - MUSE: 123 DEGREES
R AXIS - MUSE: 140 DEGREES
RBC # BLD AUTO: 3.78 10E6/UL (ref 4.4–5.9)
RBC # BLD AUTO: 3.91 10E6/UL (ref 4.4–5.9)
RBC # BLD AUTO: 3.98 10E6/UL (ref 4.4–5.9)
RBC # BLD AUTO: 4.2 10E6/UL (ref 4.4–5.9)
RBC # BLD AUTO: 4.93 10E6/UL (ref 4.4–5.9)
RETICS # AUTO: 0.1 10E6/UL (ref 0.01–0.11)
RETICS/RBC NFR AUTO: 2.4 % (ref 0.8–2.7)
SA TARGET DNA: NEGATIVE
SAO2 % BLDV: 68 % (ref 70–75)
SARS-COV-2 RNA RESP QL NAA+PROBE: NEGATIVE
SODIUM SERPL-SCNC: 141 MMOL/L (ref 136–145)
SODIUM SERPL-SCNC: 142 MMOL/L (ref 136–145)
SODIUM SERPL-SCNC: 144 MMOL/L (ref 136–145)
SODIUM SERPL-SCNC: 148 MMOL/L (ref 136–145)
SODIUM SERPL-SCNC: 148 MMOL/L (ref 136–145)
SPECIMEN EXPIRATION DATE: NORMAL
SYSTOLIC BLOOD PRESSURE - MUSE: 156 MMHG
SYSTOLIC BLOOD PRESSURE - MUSE: NORMAL MMHG
T AXIS - MUSE: 21 DEGREES
T AXIS - MUSE: 6 DEGREES
TRIGL SERPL-MCNC: 55 MG/DL
TROPONIN T SERPL HS-MCNC: 137 NG/L
TROPONIN T SERPL HS-MCNC: 49 NG/L
VENTRICULAR RATE- MUSE: 101 BPM
VENTRICULAR RATE- MUSE: 87 BPM
WBC # BLD AUTO: 23.2 10E3/UL (ref 4–11)
WBC # BLD AUTO: 6.4 10E3/UL (ref 4–11)
WBC # BLD AUTO: 7.4 10E3/UL (ref 4–11)
WBC # BLD AUTO: 7.8 10E3/UL (ref 4–11)
WBC # BLD AUTO: 8.4 10E3/UL (ref 4–11)

## 2023-01-01 PROCEDURE — 255N000002 HC RX 255 OP 636: Performed by: INTERNAL MEDICINE

## 2023-01-01 PROCEDURE — 250N000013 HC RX MED GY IP 250 OP 250 PS 637: Performed by: INTERNAL MEDICINE

## 2023-01-01 PROCEDURE — 250N000011 HC RX IP 250 OP 636: Mod: JZ | Performed by: STUDENT IN AN ORGANIZED HEALTH CARE EDUCATION/TRAINING PROGRAM

## 2023-01-01 PROCEDURE — 93005 ELECTROCARDIOGRAM TRACING: CPT | Performed by: INTERNAL MEDICINE

## 2023-01-01 PROCEDURE — 36415 COLL VENOUS BLD VENIPUNCTURE: CPT | Performed by: INTERNAL MEDICINE

## 2023-01-01 PROCEDURE — 87641 MR-STAPH DNA AMP PROBE: CPT | Performed by: INTERNAL MEDICINE

## 2023-01-01 PROCEDURE — 97535 SELF CARE MNGMENT TRAINING: CPT | Mod: GO

## 2023-01-01 PROCEDURE — 84145 PROCALCITONIN (PCT): CPT | Performed by: INTERNAL MEDICINE

## 2023-01-01 PROCEDURE — 85025 COMPLETE CBC W/AUTO DIFF WBC: CPT | Performed by: INTERNAL MEDICINE

## 2023-01-01 PROCEDURE — 85025 COMPLETE CBC W/AUTO DIFF WBC: CPT | Performed by: NURSE PRACTITIONER

## 2023-01-01 PROCEDURE — 87040 BLOOD CULTURE FOR BACTERIA: CPT | Performed by: INTERNAL MEDICINE

## 2023-01-01 PROCEDURE — 80061 LIPID PANEL: CPT | Performed by: INTERNAL MEDICINE

## 2023-01-01 PROCEDURE — 92610 EVALUATE SWALLOWING FUNCTION: CPT | Mod: GN

## 2023-01-01 PROCEDURE — C9113 INJ PANTOPRAZOLE SODIUM, VIA: HCPCS | Mod: JZ | Performed by: INTERNAL MEDICINE

## 2023-01-01 PROCEDURE — 120N000001 HC R&B MED SURG/OB

## 2023-01-01 PROCEDURE — 36415 COLL VENOUS BLD VENIPUNCTURE: CPT | Performed by: NURSE PRACTITIONER

## 2023-01-01 PROCEDURE — 93306 TTE W/DOPPLER COMPLETE: CPT | Mod: 26 | Performed by: INTERNAL MEDICINE

## 2023-01-01 PROCEDURE — 99232 SBSQ HOSP IP/OBS MODERATE 35: CPT | Performed by: INTERNAL MEDICINE

## 2023-01-01 PROCEDURE — 82805 BLOOD GASES W/O2 SATURATION: CPT | Performed by: INTERNAL MEDICINE

## 2023-01-01 PROCEDURE — 80053 COMPREHEN METABOLIC PANEL: CPT | Performed by: INTERNAL MEDICINE

## 2023-01-01 PROCEDURE — 85027 COMPLETE CBC AUTOMATED: CPT | Performed by: INTERNAL MEDICINE

## 2023-01-01 PROCEDURE — 99233 SBSQ HOSP IP/OBS HIGH 50: CPT | Performed by: INTERNAL MEDICINE

## 2023-01-01 PROCEDURE — 83880 ASSAY OF NATRIURETIC PEPTIDE: CPT | Performed by: NURSE PRACTITIONER

## 2023-01-01 PROCEDURE — 258N000003 HC RX IP 258 OP 636: Performed by: INTERNAL MEDICINE

## 2023-01-01 PROCEDURE — 83605 ASSAY OF LACTIC ACID: CPT | Performed by: INTERNAL MEDICINE

## 2023-01-01 PROCEDURE — 82374 ASSAY BLOOD CARBON DIOXIDE: CPT | Performed by: NURSE PRACTITIONER

## 2023-01-01 PROCEDURE — 80048 BASIC METABOLIC PNL TOTAL CA: CPT | Performed by: INTERNAL MEDICINE

## 2023-01-01 PROCEDURE — 86880 COOMBS TEST DIRECT: CPT | Performed by: INTERNAL MEDICINE

## 2023-01-01 PROCEDURE — 99239 HOSP IP/OBS DSCHRG MGMT >30: CPT | Performed by: INTERNAL MEDICINE

## 2023-01-01 PROCEDURE — 97116 GAIT TRAINING THERAPY: CPT | Mod: GP

## 2023-01-01 PROCEDURE — P9603 ONE-WAY ALLOW PRORATED MILES: HCPCS | Performed by: NURSE PRACTITIONER

## 2023-01-01 PROCEDURE — 93005 ELECTROCARDIOGRAM TRACING: CPT | Performed by: STUDENT IN AN ORGANIZED HEALTH CARE EDUCATION/TRAINING PROGRAM

## 2023-01-01 PROCEDURE — 99222 1ST HOSP IP/OBS MODERATE 55: CPT | Performed by: INTERNAL MEDICINE

## 2023-01-01 PROCEDURE — 85018 HEMOGLOBIN: CPT | Performed by: INTERNAL MEDICINE

## 2023-01-01 PROCEDURE — 97116 GAIT TRAINING THERAPY: CPT | Mod: GP | Performed by: PHYSICAL THERAPIST

## 2023-01-01 PROCEDURE — 97110 THERAPEUTIC EXERCISES: CPT | Mod: GP | Performed by: PHYSICAL THERAPIST

## 2023-01-01 PROCEDURE — 250N000011 HC RX IP 250 OP 636: Mod: JZ | Performed by: INTERNAL MEDICINE

## 2023-01-01 PROCEDURE — 85025 COMPLETE CBC W/AUTO DIFF WBC: CPT | Performed by: STUDENT IN AN ORGANIZED HEALTH CARE EDUCATION/TRAINING PROGRAM

## 2023-01-01 PROCEDURE — 99285 EMERGENCY DEPT VISIT HI MDM: CPT | Mod: 25

## 2023-01-01 PROCEDURE — 83036 HEMOGLOBIN GLYCOSYLATED A1C: CPT | Performed by: INTERNAL MEDICINE

## 2023-01-01 PROCEDURE — 97110 THERAPEUTIC EXERCISES: CPT | Mod: GP

## 2023-01-01 PROCEDURE — 83550 IRON BINDING TEST: CPT | Performed by: INTERNAL MEDICINE

## 2023-01-01 PROCEDURE — 82310 ASSAY OF CALCIUM: CPT | Performed by: INTERNAL MEDICINE

## 2023-01-01 PROCEDURE — P9604 ONE-WAY ALLOW PRORATED TRIP: HCPCS | Mod: ORL | Performed by: NURSE PRACTITIONER

## 2023-01-01 PROCEDURE — P9604 ONE-WAY ALLOW PRORATED TRIP: HCPCS | Performed by: INTERNAL MEDICINE

## 2023-01-01 PROCEDURE — 80053 COMPREHEN METABOLIC PANEL: CPT | Performed by: STUDENT IN AN ORGANIZED HEALTH CARE EDUCATION/TRAINING PROGRAM

## 2023-01-01 PROCEDURE — 36415 COLL VENOUS BLD VENIPUNCTURE: CPT | Performed by: STUDENT IN AN ORGANIZED HEALTH CARE EDUCATION/TRAINING PROGRAM

## 2023-01-01 PROCEDURE — 370N000003 HC ANESTHESIA WARD SERVICE: Performed by: NURSE ANESTHETIST, CERTIFIED REGISTERED

## 2023-01-01 PROCEDURE — 71046 X-RAY EXAM CHEST 2 VIEWS: CPT

## 2023-01-01 PROCEDURE — 83880 ASSAY OF NATRIURETIC PEPTIDE: CPT | Performed by: INTERNAL MEDICINE

## 2023-01-01 PROCEDURE — 85045 AUTOMATED RETICULOCYTE COUNT: CPT | Performed by: INTERNAL MEDICINE

## 2023-01-01 PROCEDURE — 82962 GLUCOSE BLOOD TEST: CPT

## 2023-01-01 PROCEDURE — 97530 THERAPEUTIC ACTIVITIES: CPT | Mod: GP | Performed by: PHYSICAL THERAPIST

## 2023-01-01 PROCEDURE — 87205 SMEAR GRAM STAIN: CPT | Performed by: INTERNAL MEDICINE

## 2023-01-01 PROCEDURE — 250N000009 HC RX 250: Performed by: STUDENT IN AN ORGANIZED HEALTH CARE EDUCATION/TRAINING PROGRAM

## 2023-01-01 PROCEDURE — 84484 ASSAY OF TROPONIN QUANT: CPT | Performed by: STUDENT IN AN ORGANIZED HEALTH CARE EDUCATION/TRAINING PROGRAM

## 2023-01-01 PROCEDURE — 99222 1ST HOSP IP/OBS MODERATE 55: CPT | Mod: AI | Performed by: INTERNAL MEDICINE

## 2023-01-01 PROCEDURE — 83010 ASSAY OF HAPTOGLOBIN QUANT: CPT | Performed by: INTERNAL MEDICINE

## 2023-01-01 PROCEDURE — 94640 AIRWAY INHALATION TREATMENT: CPT

## 2023-01-01 PROCEDURE — 97161 PT EVAL LOW COMPLEX 20 MIN: CPT | Mod: GP

## 2023-01-01 PROCEDURE — 999N000248 HC STATISTIC IV INSERT WITH US BY RN

## 2023-01-01 PROCEDURE — 82728 ASSAY OF FERRITIN: CPT | Performed by: INTERNAL MEDICINE

## 2023-01-01 PROCEDURE — 83615 LACTATE (LD) (LDH) ENZYME: CPT | Performed by: INTERNAL MEDICINE

## 2023-01-01 PROCEDURE — 87635 SARS-COV-2 COVID-19 AMP PRB: CPT | Performed by: INTERNAL MEDICINE

## 2023-01-01 PROCEDURE — 999N000208 ECHOCARDIOGRAM COMPLETE

## 2023-01-01 PROCEDURE — 97166 OT EVAL MOD COMPLEX 45 MIN: CPT | Mod: GO

## 2023-01-01 PROCEDURE — 80048 BASIC METABOLIC PNL TOTAL CA: CPT | Mod: ORL | Performed by: NURSE PRACTITIONER

## 2023-01-01 PROCEDURE — 83735 ASSAY OF MAGNESIUM: CPT | Performed by: STUDENT IN AN ORGANIZED HEALTH CARE EDUCATION/TRAINING PROGRAM

## 2023-01-01 RX ORDER — HYDRALAZINE HYDROCHLORIDE 20 MG/ML
10 INJECTION INTRAMUSCULAR; INTRAVENOUS EVERY 6 HOURS PRN
Status: DISCONTINUED | OUTPATIENT
Start: 2023-01-01 | End: 2023-01-01 | Stop reason: HOSPADM

## 2023-01-01 RX ORDER — ISOSORBIDE MONONITRATE 30 MG/1
30 TABLET, EXTENDED RELEASE ORAL DAILY
Status: DISCONTINUED | OUTPATIENT
Start: 2023-01-01 | End: 2023-01-01 | Stop reason: HOSPADM

## 2023-01-01 RX ORDER — METOPROLOL TARTRATE 25 MG/1
50 TABLET, FILM COATED ORAL 2 TIMES DAILY
Status: DISCONTINUED | OUTPATIENT
Start: 2023-01-01 | End: 2023-01-01 | Stop reason: HOSPADM

## 2023-01-01 RX ORDER — ACETAMINOPHEN 650 MG/1
650 SUPPOSITORY RECTAL EVERY 4 HOURS PRN
Status: DISCONTINUED | OUTPATIENT
Start: 2023-01-01 | End: 2023-01-01 | Stop reason: HOSPADM

## 2023-01-01 RX ORDER — AMOXICILLIN AND CLAVULANATE POTASSIUM 500; 125 MG/1; MG/1
1 TABLET, FILM COATED ORAL EVERY 12 HOURS SCHEDULED
Status: DISCONTINUED | OUTPATIENT
Start: 2023-01-01 | End: 2023-01-01 | Stop reason: HOSPADM

## 2023-01-01 RX ORDER — FUROSEMIDE 10 MG/ML
20 INJECTION INTRAMUSCULAR; INTRAVENOUS EVERY 12 HOURS
Status: DISCONTINUED | OUTPATIENT
Start: 2023-01-01 | End: 2023-01-01

## 2023-01-01 RX ORDER — LIDOCAINE 40 MG/G
CREAM TOPICAL
Status: DISCONTINUED | OUTPATIENT
Start: 2023-01-01 | End: 2023-01-01 | Stop reason: HOSPADM

## 2023-01-01 RX ORDER — GUAIFENESIN 600 MG/1
600 TABLET, EXTENDED RELEASE ORAL 2 TIMES DAILY
Status: DISCONTINUED | OUTPATIENT
Start: 2023-01-01 | End: 2023-01-01 | Stop reason: HOSPADM

## 2023-01-01 RX ORDER — PIPERACILLIN SODIUM, TAZOBACTAM SODIUM 3; .375 G/15ML; G/15ML
3.38 INJECTION, POWDER, LYOPHILIZED, FOR SOLUTION INTRAVENOUS ONCE
Status: COMPLETED | OUTPATIENT
Start: 2023-01-01 | End: 2023-01-01

## 2023-01-01 RX ORDER — ASPIRIN 81 MG/1
162 TABLET, CHEWABLE ORAL ONCE
Status: COMPLETED | OUTPATIENT
Start: 2023-01-01 | End: 2023-01-01

## 2023-01-01 RX ORDER — DOXYCYCLINE 100 MG/10ML
100 INJECTION, POWDER, LYOPHILIZED, FOR SOLUTION INTRAVENOUS EVERY 12 HOURS
Status: DISCONTINUED | OUTPATIENT
Start: 2023-01-01 | End: 2023-01-01

## 2023-01-01 RX ORDER — POLYETHYLENE GLYCOL 3350 17 G/17G
17 POWDER, FOR SOLUTION ORAL DAILY
Status: DISCONTINUED | OUTPATIENT
Start: 2023-01-01 | End: 2023-01-01 | Stop reason: HOSPADM

## 2023-01-01 RX ORDER — FUROSEMIDE 20 MG
20 TABLET ORAL DAILY
Status: DISCONTINUED | OUTPATIENT
Start: 2023-01-01 | End: 2023-01-01

## 2023-01-01 RX ORDER — IPRATROPIUM BROMIDE AND ALBUTEROL SULFATE 2.5; .5 MG/3ML; MG/3ML
3 SOLUTION RESPIRATORY (INHALATION) ONCE
Status: COMPLETED | OUTPATIENT
Start: 2023-01-01 | End: 2023-01-01

## 2023-01-01 RX ORDER — NITROGLYCERIN 0.4 MG/1
0.4 TABLET SUBLINGUAL EVERY 5 MIN PRN
Status: DISCONTINUED | OUTPATIENT
Start: 2023-01-01 | End: 2023-01-01 | Stop reason: HOSPADM

## 2023-01-01 RX ORDER — ACETAMINOPHEN 325 MG/1
650 TABLET ORAL EVERY 4 HOURS PRN
Status: DISCONTINUED | OUTPATIENT
Start: 2023-01-01 | End: 2023-01-01 | Stop reason: HOSPADM

## 2023-01-01 RX ORDER — NICOTINE POLACRILEX 4 MG
15-30 LOZENGE BUCCAL
Status: DISCONTINUED | OUTPATIENT
Start: 2023-01-01 | End: 2023-01-01 | Stop reason: HOSPADM

## 2023-01-01 RX ORDER — DOXYCYCLINE 100 MG/1
100 CAPSULE ORAL EVERY 12 HOURS SCHEDULED
Status: DISCONTINUED | OUTPATIENT
Start: 2023-01-01 | End: 2023-01-01 | Stop reason: HOSPADM

## 2023-01-01 RX ORDER — CALCIUM CARBONATE 500 MG/1
500 TABLET, CHEWABLE ORAL 3 TIMES DAILY PRN
Status: DISCONTINUED | OUTPATIENT
Start: 2023-01-01 | End: 2023-01-01 | Stop reason: HOSPADM

## 2023-01-01 RX ORDER — PANTOPRAZOLE SODIUM 40 MG/1
40 TABLET, DELAYED RELEASE ORAL
Status: DISCONTINUED | OUTPATIENT
Start: 2023-01-01 | End: 2023-01-01 | Stop reason: HOSPADM

## 2023-01-01 RX ORDER — ONDANSETRON 4 MG/1
4 TABLET, ORALLY DISINTEGRATING ORAL EVERY 6 HOURS PRN
Status: DISCONTINUED | OUTPATIENT
Start: 2023-01-01 | End: 2023-01-01 | Stop reason: HOSPADM

## 2023-01-01 RX ORDER — DOXYCYCLINE 100 MG/1
100 CAPSULE ORAL EVERY 12 HOURS
DISCHARGE
Start: 2023-01-01 | End: 2023-01-01

## 2023-01-01 RX ORDER — ONDANSETRON 2 MG/ML
4 INJECTION INTRAMUSCULAR; INTRAVENOUS EVERY 6 HOURS PRN
Status: DISCONTINUED | OUTPATIENT
Start: 2023-01-01 | End: 2023-01-01 | Stop reason: HOSPADM

## 2023-01-01 RX ORDER — AMOXICILLIN AND CLAVULANATE POTASSIUM 500; 125 MG/1; MG/1
1 TABLET, FILM COATED ORAL EVERY 12 HOURS
DISCHARGE
Start: 2023-01-01 | End: 2023-01-01

## 2023-01-01 RX ORDER — ISOSORBIDE MONONITRATE 30 MG/1
30 TABLET, EXTENDED RELEASE ORAL DAILY
DISCHARGE
Start: 2023-01-01

## 2023-01-01 RX ORDER — PIPERACILLIN SODIUM, TAZOBACTAM SODIUM 3; .375 G/15ML; G/15ML
3.38 INJECTION, POWDER, LYOPHILIZED, FOR SOLUTION INTRAVENOUS EVERY 8 HOURS
Status: DISCONTINUED | OUTPATIENT
Start: 2023-01-01 | End: 2023-01-01

## 2023-01-01 RX ORDER — CALCIUM CARBONATE 500 MG/1
1 TABLET, CHEWABLE ORAL 3 TIMES DAILY PRN
DISCHARGE
Start: 2023-01-01

## 2023-01-01 RX ORDER — NITROGLYCERIN 0.4 MG/1
TABLET SUBLINGUAL
DISCHARGE
Start: 2023-01-01

## 2023-01-01 RX ORDER — DEXTROSE MONOHYDRATE 25 G/50ML
25-50 INJECTION, SOLUTION INTRAVENOUS
Status: DISCONTINUED | OUTPATIENT
Start: 2023-01-01 | End: 2023-01-01 | Stop reason: HOSPADM

## 2023-01-01 RX ADMIN — PIPERACILLIN AND TAZOBACTAM 3.38 G: 3; .375 INJECTION, POWDER, LYOPHILIZED, FOR SOLUTION INTRAVENOUS at 19:33

## 2023-01-01 RX ADMIN — ISOSORBIDE MONONITRATE 30 MG: 30 TABLET, EXTENDED RELEASE ORAL at 08:35

## 2023-01-01 RX ADMIN — ISOSORBIDE MONONITRATE 30 MG: 30 TABLET, EXTENDED RELEASE ORAL at 08:37

## 2023-01-01 RX ADMIN — Medication 1 MG: at 23:43

## 2023-01-01 RX ADMIN — ISOSORBIDE MONONITRATE 30 MG: 30 TABLET, EXTENDED RELEASE ORAL at 09:37

## 2023-01-01 RX ADMIN — METOPROLOL TARTRATE 50 MG: 25 TABLET, FILM COATED ORAL at 19:27

## 2023-01-01 RX ADMIN — PANTOPRAZOLE SODIUM 40 MG: 40 INJECTION, POWDER, FOR SOLUTION INTRAVENOUS at 12:58

## 2023-01-01 RX ADMIN — AMOXICILLIN AND CLAVULANATE POTASSIUM 1 TABLET: 500; 125 TABLET, FILM COATED ORAL at 07:59

## 2023-01-01 RX ADMIN — POLYETHYLENE GLYCOL 3350 17 G: 17 POWDER, FOR SOLUTION ORAL at 08:01

## 2023-01-01 RX ADMIN — METOPROLOL TARTRATE 50 MG: 25 TABLET, FILM COATED ORAL at 09:36

## 2023-01-01 RX ADMIN — PIPERACILLIN AND TAZOBACTAM 3.38 G: 3; .375 INJECTION, POWDER, LYOPHILIZED, FOR SOLUTION INTRAVENOUS at 13:02

## 2023-01-01 RX ADMIN — NITROGLYCERIN 0.4 MG: 0.4 TABLET, ORALLY DISINTEGRATING SUBLINGUAL at 16:58

## 2023-01-01 RX ADMIN — DOXYCYCLINE 100 MG: 100 CAPSULE ORAL at 13:42

## 2023-01-01 RX ADMIN — METOPROLOL TARTRATE 50 MG: 25 TABLET, FILM COATED ORAL at 08:35

## 2023-01-01 RX ADMIN — GUAIFENESIN 600 MG: 600 TABLET, EXTENDED RELEASE ORAL at 08:02

## 2023-01-01 RX ADMIN — PERFLUTREN 3 ML: 6.52 INJECTION, SUSPENSION INTRAVENOUS at 10:25

## 2023-01-01 RX ADMIN — METOPROLOL TARTRATE 50 MG: 25 TABLET, FILM COATED ORAL at 21:37

## 2023-01-01 RX ADMIN — FUROSEMIDE 20 MG: 10 INJECTION, SOLUTION INTRAMUSCULAR; INTRAVENOUS at 19:32

## 2023-01-01 RX ADMIN — PANTOPRAZOLE SODIUM 40 MG: 40 TABLET, DELAYED RELEASE ORAL at 08:35

## 2023-01-01 RX ADMIN — METOPROLOL TARTRATE 50 MG: 25 TABLET, FILM COATED ORAL at 08:38

## 2023-01-01 RX ADMIN — DOXYCYCLINE 100 MG: 100 CAPSULE ORAL at 20:20

## 2023-01-01 RX ADMIN — PIPERACILLIN AND TAZOBACTAM 3.38 G: 3; .375 INJECTION, POWDER, LYOPHILIZED, FOR SOLUTION INTRAVENOUS at 03:32

## 2023-01-01 RX ADMIN — PIPERACILLIN AND TAZOBACTAM 3.38 G: 3; .375 INJECTION, POWDER, LYOPHILIZED, FOR SOLUTION INTRAVENOUS at 23:02

## 2023-01-01 RX ADMIN — IRON SUCROSE 200 MG: 20 INJECTION, SOLUTION INTRAVENOUS at 21:34

## 2023-01-01 RX ADMIN — DOXYCYCLINE 100 MG: 100 INJECTION, POWDER, LYOPHILIZED, FOR SOLUTION INTRAVENOUS at 19:40

## 2023-01-01 RX ADMIN — DOXYCYCLINE 100 MG: 100 INJECTION, POWDER, LYOPHILIZED, FOR SOLUTION INTRAVENOUS at 19:18

## 2023-01-01 RX ADMIN — ISOSORBIDE MONONITRATE 30 MG: 30 TABLET, EXTENDED RELEASE ORAL at 07:56

## 2023-01-01 RX ADMIN — AMOXICILLIN AND CLAVULANATE POTASSIUM 1 TABLET: 500; 125 TABLET, FILM COATED ORAL at 08:36

## 2023-01-01 RX ADMIN — METOPROLOL TARTRATE 50 MG: 25 TABLET, FILM COATED ORAL at 20:48

## 2023-01-01 RX ADMIN — PIPERACILLIN AND TAZOBACTAM 3.38 G: 3; .375 INJECTION, POWDER, LYOPHILIZED, FOR SOLUTION INTRAVENOUS at 02:17

## 2023-01-01 RX ADMIN — ACETAMINOPHEN 650 MG: 325 TABLET ORAL at 23:43

## 2023-01-01 RX ADMIN — METOPROLOL TARTRATE 50 MG: 25 TABLET, FILM COATED ORAL at 08:40

## 2023-01-01 RX ADMIN — NITROGLYCERIN 0.4 MG: 0.4 TABLET, ORALLY DISINTEGRATING SUBLINGUAL at 14:24

## 2023-01-01 RX ADMIN — GUAIFENESIN 600 MG: 600 TABLET, EXTENDED RELEASE ORAL at 13:43

## 2023-01-01 RX ADMIN — GUAIFENESIN 600 MG: 600 TABLET, EXTENDED RELEASE ORAL at 20:36

## 2023-01-01 RX ADMIN — METOPROLOL TARTRATE 50 MG: 25 TABLET, FILM COATED ORAL at 20:20

## 2023-01-01 RX ADMIN — POLYETHYLENE GLYCOL 3350 17 G: 17 POWDER, FOR SOLUTION ORAL at 08:36

## 2023-01-01 RX ADMIN — IPRATROPIUM BROMIDE AND ALBUTEROL SULFATE 3 ML: .5; 3 SOLUTION RESPIRATORY (INHALATION) at 09:16

## 2023-01-01 RX ADMIN — ACETAMINOPHEN 650 MG: 325 TABLET ORAL at 17:05

## 2023-01-01 RX ADMIN — DOXYCYCLINE 100 MG: 100 INJECTION, POWDER, LYOPHILIZED, FOR SOLUTION INTRAVENOUS at 06:29

## 2023-01-01 RX ADMIN — PIPERACILLIN AND TAZOBACTAM 3.38 G: 3; .375 INJECTION, POWDER, LYOPHILIZED, FOR SOLUTION INTRAVENOUS at 04:25

## 2023-01-01 RX ADMIN — DOXYCYCLINE 100 MG: 100 INJECTION, POWDER, LYOPHILIZED, FOR SOLUTION INTRAVENOUS at 17:37

## 2023-01-01 RX ADMIN — PANTOPRAZOLE SODIUM 40 MG: 40 INJECTION, POWDER, FOR SOLUTION INTRAVENOUS at 09:36

## 2023-01-01 RX ADMIN — DOXYCYCLINE 100 MG: 100 CAPSULE ORAL at 20:36

## 2023-01-01 RX ADMIN — ASPIRIN 81 MG CHEWABLE TABLET 162 MG: 81 TABLET CHEWABLE at 12:57

## 2023-01-01 RX ADMIN — PIPERACILLIN AND TAZOBACTAM 3.38 G: 3; .375 INJECTION, POWDER, LYOPHILIZED, FOR SOLUTION INTRAVENOUS at 12:35

## 2023-01-01 RX ADMIN — DOXYCYCLINE 100 MG: 100 CAPSULE ORAL at 08:35

## 2023-01-01 RX ADMIN — FUROSEMIDE 20 MG: 10 INJECTION, SOLUTION INTRAMUSCULAR; INTRAVENOUS at 05:22

## 2023-01-01 RX ADMIN — FUROSEMIDE 20 MG: 10 INJECTION, SOLUTION INTRAMUSCULAR; INTRAVENOUS at 07:10

## 2023-01-01 RX ADMIN — DOXYCYCLINE 100 MG: 100 INJECTION, POWDER, LYOPHILIZED, FOR SOLUTION INTRAVENOUS at 19:26

## 2023-01-01 RX ADMIN — DOXYCYCLINE 100 MG: 100 INJECTION, POWDER, LYOPHILIZED, FOR SOLUTION INTRAVENOUS at 05:21

## 2023-01-01 RX ADMIN — ISOSORBIDE MONONITRATE 30 MG: 30 TABLET, EXTENDED RELEASE ORAL at 08:40

## 2023-01-01 RX ADMIN — AMOXICILLIN AND CLAVULANATE POTASSIUM 1 TABLET: 500; 125 TABLET, FILM COATED ORAL at 18:09

## 2023-01-01 RX ADMIN — PANTOPRAZOLE SODIUM 40 MG: 40 INJECTION, POWDER, FOR SOLUTION INTRAVENOUS at 19:32

## 2023-01-01 RX ADMIN — ISOSORBIDE MONONITRATE 30 MG: 30 TABLET, EXTENDED RELEASE ORAL at 15:22

## 2023-01-01 RX ADMIN — AMOXICILLIN AND CLAVULANATE POTASSIUM 1 TABLET: 500; 125 TABLET, FILM COATED ORAL at 20:35

## 2023-01-01 RX ADMIN — METOPROLOL TARTRATE 50 MG: 25 TABLET, FILM COATED ORAL at 07:56

## 2023-01-01 RX ADMIN — PANTOPRAZOLE SODIUM 40 MG: 40 INJECTION, POWDER, FOR SOLUTION INTRAVENOUS at 08:41

## 2023-01-01 RX ADMIN — METOPROLOL TARTRATE 50 MG: 25 TABLET, FILM COATED ORAL at 20:41

## 2023-01-01 RX ADMIN — GUAIFENESIN 600 MG: 600 TABLET, EXTENDED RELEASE ORAL at 20:20

## 2023-01-01 RX ADMIN — GUAIFENESIN 600 MG: 600 TABLET, EXTENDED RELEASE ORAL at 08:35

## 2023-01-01 RX ADMIN — PANTOPRAZOLE SODIUM 40 MG: 40 TABLET, DELAYED RELEASE ORAL at 07:55

## 2023-01-01 RX ADMIN — FUROSEMIDE 20 MG: 10 INJECTION, SOLUTION INTRAMUSCULAR; INTRAVENOUS at 17:38

## 2023-01-01 RX ADMIN — PIPERACILLIN AND TAZOBACTAM 3.38 G: 3; .375 INJECTION, POWDER, LYOPHILIZED, FOR SOLUTION INTRAVENOUS at 10:55

## 2023-01-01 RX ADMIN — PANTOPRAZOLE SODIUM 40 MG: 40 INJECTION, POWDER, FOR SOLUTION INTRAVENOUS at 08:38

## 2023-01-01 RX ADMIN — POLYETHYLENE GLYCOL 3350 17 G: 17 POWDER, FOR SOLUTION ORAL at 11:21

## 2023-01-01 RX ADMIN — PANTOPRAZOLE SODIUM 40 MG: 40 INJECTION, POWDER, FOR SOLUTION INTRAVENOUS at 19:29

## 2023-01-01 RX ADMIN — PANTOPRAZOLE SODIUM 40 MG: 40 INJECTION, POWDER, FOR SOLUTION INTRAVENOUS at 20:41

## 2023-01-01 RX ADMIN — PIPERACILLIN AND TAZOBACTAM 3.38 G: 3; .375 INJECTION, POWDER, LYOPHILIZED, FOR SOLUTION INTRAVENOUS at 20:41

## 2023-01-01 RX ADMIN — DOXYCYCLINE 100 MG: 100 CAPSULE ORAL at 07:56

## 2023-01-01 ASSESSMENT — ACTIVITIES OF DAILY LIVING (ADL)
ADLS_ACUITY_SCORE: 39
ADLS_ACUITY_SCORE: 22
ADLS_ACUITY_SCORE: 28
ADLS_ACUITY_SCORE: 28
WEAR_GLASSES_OR_BLIND: YES
DIFFICULTY_EATING/SWALLOWING: NO
ADLS_ACUITY_SCORE: 28
CONCENTRATING,_REMEMBERING_OR_MAKING_DECISIONS_DIFFICULTY: NO
ADLS_ACUITY_SCORE: 28
ADLS_ACUITY_SCORE: 35
ADLS_ACUITY_SCORE: 28
VISION_MANAGEMENT: GLASSES
ADLS_ACUITY_SCORE: 28
ADLS_ACUITY_SCORE: 28
ADLS_ACUITY_SCORE: 39
ADLS_ACUITY_SCORE: 28
TOILETING_ISSUES: NO
ADLS_ACUITY_SCORE: 28
CHANGE_IN_FUNCTIONAL_STATUS_SINCE_ONSET_OF_CURRENT_ILLNESS/INJURY: YES
ADLS_ACUITY_SCORE: 22
ADLS_ACUITY_SCORE: 22
ADLS_ACUITY_SCORE: 28
DOING_ERRANDS_INDEPENDENTLY_DIFFICULTY: NO
ADLS_ACUITY_SCORE: 28
ADLS_ACUITY_SCORE: 28
WALKING_OR_CLIMBING_STAIRS_DIFFICULTY: NO
ADLS_ACUITY_SCORE: 39
EQUIPMENT_CURRENTLY_USED_AT_HOME: SHOWER CHAIR;WALKER, ROLLING
ADLS_ACUITY_SCORE: 37
ADLS_ACUITY_SCORE: 37
ADLS_ACUITY_SCORE: 39
ADLS_ACUITY_SCORE: 28
ADLS_ACUITY_SCORE: 37
ADLS_ACUITY_SCORE: 39
ADLS_ACUITY_SCORE: 28
ADLS_ACUITY_SCORE: 37
ADLS_ACUITY_SCORE: 28
DEPENDENT_IADLS:: INDEPENDENT;TRANSPORTATION
ADLS_ACUITY_SCORE: 22
ADLS_ACUITY_SCORE: 28
ADLS_ACUITY_SCORE: 37
DRESSING/BATHING_DIFFICULTY: NO
ADLS_ACUITY_SCORE: 28
ADLS_ACUITY_SCORE: 39
ADLS_ACUITY_SCORE: 28
ADLS_ACUITY_SCORE: 39
FALL_HISTORY_WITHIN_LAST_SIX_MONTHS: NO
ADLS_ACUITY_SCORE: 37
ADLS_ACUITY_SCORE: 39

## 2023-06-21 PROBLEM — R07.9 CHEST PAIN: Status: ACTIVE | Noted: 2023-01-01

## 2023-06-21 NOTE — PHARMACY-ADMISSION MEDICATION HISTORY
Pharmacist Admission Medication History    Admission medication history is complete. The information provided in this note is only as accurate as the sources available at the time of the update.    Medication reconciliation/reorder completed by provider prior to medication history? No    Information Source(s): Patient, Clinic records and CareEverywhere/SureScripts via in-person    Pertinent Information: none    Changes made to PTA medication list:    Added: None    Deleted: azithromycin    Changed: None    Medication Affordability:       Allergies reviewed with patient and updates made in EHR: yes    Medication History Completed By: Bree Wu RP 6/21/2023 9:56 AM    Prior to Admission medications    Medication Sig Last Dose Taking? Auth Provider Long Term End Date   apixaban ANTICOAGULANT (ELIQUIS) 5 MG tablet Take 5 mg by mouth 2 times daily 6/20/2023 at PM Yes Unknown, Entered By History     furosemide (LASIX) 40 MG tablet Take 40 mg by mouth daily 6/20/2023 at am Yes Unknown, Entered By History Yes    metoprolol tartrate (LOPRESSOR) 50 MG tablet Take 50 mg by mouth 2 times daily 6/20/2023 at pm Yes Unknown, Entered By History Yes    pantoprazole (PROTONIX) 20 MG EC tablet Take 20 mg by mouth daily 6/20/2023 at am Yes Unknown, Entered By History     potassium chloride ER (MICRO-K) 10 MEQ CR capsule Take 10 mEq by mouth daily 6/20/2023 at am Yes Unknown, Entered By History     vitamin D3 (CHOLECALCIFEROL) 50 mcg (2000 units) tablet Take 1 tablet by mouth daily 6/20/2023 at am Yes Unknown, Entered By History

## 2023-06-21 NOTE — CONSULTS
HEART CARE CONSULTATON NOTE        Assessment/Recommendations   Assessment:   1.  Chest pain concerning for acute coronary syndrome  2.  Elevated blood pressure, patient did not take his metoprolol the past 2 days per his son  3.  Chronic atrial fibrillation on Eliquis  4.  Melenic stools  5.  Anemia, MCV :80.  RDW: wide    Plan:   1. Start imdur 30 mg   2.  mg daily   3. Restart metoprolol   4. Complete echo  5. Patient is very hesitant to proceed with any procedures.  6.  Work-up anemia.,  Reticulocyte count, iron levels, ferritin.  Would give IV iron if he is iron deficient.    We will follow, discussed with Dr. Rodriguez    Clinically Significant Risk Factors Present on Admission               # Drug Induced Coagulation Defect: home medication list includes an anticoagulant medication  # Thrombocytopenia: Lowest platelets = 126 in last 2 days, will monitor for bleeding   # Hypertension: Noted on problem list             History of Present Illness/Subjective    HPI: David Dow is a 89 year old male history of chronic atrial fibrillation on anticoagulation who presents to [facility] with chest pain which resolved with single dose of nitroglycerin via EMS found to have elevated troponin and ST depressions in lateral leads of his EKG.  On arrival he was hypertensive with systolic blood pressure of 180.  He did not take his metoprolol past 2 days per his son.  More concerning is he also had intermittent dark stools over the past 2 weeks.  He does have anemia.  MCW is low at 80.    Patient did have shortness of breath and diaphoresis when attempting to go to the restroom while in the emergency department.  He did receive nitroglycerin which improved his blood pressure and alleviate his symptoms.  Currently has no active chest pain.  Dyspnea is improved at rest.  He is not active and remains mostly sedentary throughout the day per his son.     On discussion with the patient and his family regarding  management.  We will start with echocardiogram as a noninvasive approach to assess his ejection fraction.  We will start him on Imdur to improve coronary blood flow.  We will restart his metoprolol to reduce demand on his myocardium.  He continues to have chest pain symptoms or evidence of significant decline in ejection fraction we will consider further testing such as coronary angiogram.  Patient himself is not overly excited about invasive procedures and appears to prefer a medical approach as initial therapy.  I feel this is reasonable given his age.    Echo pending.  Twelve-lead EKG personally reviewed.  Demonstrated atrial fibrillation with right bundle branch block, worsening ST depressions in the lateral leads.    Case was discussed with Dr. Rodriguez       Physical Examination  Review of Systems   VITALS: BP (!) 156/108   Pulse 95   Temp 98  F (36.7  C) (Oral)   Resp 27   SpO2 98%   BMI: There is no height or weight on file to calculate BMI.  Wt Readings from Last 3 Encounters:   06/26/22 61.2 kg (135 lb)       Intake/Output Summary (Last 24 hours) at 6/21/2023 1428  Last data filed at 6/21/2023 1215  Gross per 24 hour   Intake --   Output 300 ml   Net -300 ml     General Appearance:   no distress, normal body habitus, resting comfortably in bed   ENT/Mouth: membranes moist, no oral lesions or bleeding gums.      EYES:  no scleral icterus, normal conjunctivae   Neck: no carotid bruits or thyromegaly   Chest/Lungs:   lungs are clear to auscultation, no rales or wheezing, no sternal scar, equal chest wall expansion    Cardiovascular:   Irregular. Normal first and second heart sounds with diastolic murmurs, rubs, or gallops; the carotid, radial and posterior tibial pulses are intact, Jugular venous pressure normal, no pitting edema bilaterally    Abdomen:  no organomegaly, masses, bruits, or tenderness; bowel sounds are present suprapubic catheter in place..    Extremities: no cyanosis or clubbing   Skin: no  xanthelasma, warm.    Neurologic: normal  bilateral, no tremors     Psychiatric: alert and oriented, calm     Review Of Systems  Skin: negative  Eyes: negative  Ears/Nose/Throat: negative  Respiratory: Dyspnea   Cardiovascular: positive for chest pain  Gastrointestinal: negative  Genitourinary: negative  Musculoskeletal: negative  Neurologic: negative  Psychiatric: negative  Hematologic/Lymphatic/Immunologic: negative  Endocrine: negative          Lab Results    Chemistry/lipid CBC Cardiac Enzymes/BNP/TSH/INR   No results for input(s): CHOL, HDL, LDL, TRIG, CHOLHDLRATIO in the last 73829 hours.  No results for input(s): LDL in the last 09532 hours.  Recent Labs   Lab Test 06/21/23  1354 06/21/23  0913   NA  --  141   POTASSIUM  --  3.8   CHLORIDE  --  102   CO2  --  29   * 183*   BUN  --  23.0   CR  --  1.02   GFRESTIMATED  --  69   JOSESITO  --  8.9     Recent Labs   Lab Test 06/21/23  0913 12/07/22  1025 07/06/22  0725   CR 1.02 1.18* 0.98     Recent Labs   Lab Test 06/21/23  0913   A1C 6.0*          Recent Labs   Lab Test 06/21/23  0913   WBC 6.4   HGB 9.4*   HCT 31.8*   MCV 80   *     Recent Labs   Lab Test 06/21/23  0913 12/07/22  1025 07/06/22  0725   HGB 9.4* 13.8 13.0*    Recent Labs   Lab Test 06/26/22  1235   TROPONINI 0.03     Recent Labs   Lab Test 06/21/23  1148   NTBNPI 3,514*     Recent Labs   Lab Test 09/15/21  0655   TSH 2.43     No results for input(s): INR in the last 79301 hours.     Medical History  Surgical History Family History Social History   Past Medical History:   Diagnosis Date     Anticoagulated by anticoagulation treatment 6/26/2022     Chronic atrial fibrillation (H) 6/26/2022     HTN (hypertension) 6/26/2022     Suprapubic catheter (H) 6/26/2022     No past surgical history on file.  No family history on file.     Social History     Socioeconomic History     Marital status:      Spouse name: Not on file     Number of children: Not on file     Years of education:  Not on file     Highest education level: Not on file   Occupational History     Not on file   Tobacco Use     Smoking status: Not on file     Smokeless tobacco: Not on file   Substance and Sexual Activity     Alcohol use: Not on file     Drug use: Not on file     Sexual activity: Not on file   Other Topics Concern     Not on file   Social History Narrative     Not on file     Social Determinants of Health     Financial Resource Strain: Not on file   Food Insecurity: Not on file   Transportation Needs: Not on file   Physical Activity: Not on file   Stress: Not on file   Social Connections: Not on file   Intimate Partner Violence: Not on file   Housing Stability: Not on file         Medications  Allergies   Current Outpatient Medications   Medication Sig Dispense Refill     apixaban ANTICOAGULANT (ELIQUIS) 5 MG tablet Take 5 mg by mouth 2 times daily       furosemide (LASIX) 40 MG tablet Take 40 mg by mouth daily       metoprolol tartrate (LOPRESSOR) 50 MG tablet Take 50 mg by mouth 2 times daily       pantoprazole (PROTONIX) 20 MG EC tablet Take 20 mg by mouth daily       potassium chloride ER (MICRO-K) 10 MEQ CR capsule Take 10 mEq by mouth daily       vitamin D3 (CHOLECALCIFEROL) 50 mcg (2000 units) tablet Take 1 tablet by mouth daily        No Known Allergies      Scotty Glover DO

## 2023-06-21 NOTE — ED PROVIDER NOTES
NAME: David Dow  AGE: 89 year old male  YOB: 1930  MRN: [id number]  EVALUATION DATE & TIME: 2023  8:56 AM    PCP: System, Provider Not In  ED PROVIDER: Prudence Gomez MD.    Chief Complaint   Patient presents with     Chest Pain       FINAL IMPRESSION:  1. Chest pain, unspecified type        MEDICAL DECISION MAKIN:01 AM I met with the patient, obtained history, performed an initial exam, and discussed options and plan for diagnostics and treatment here in the ED.   11:00 AM I rechecked on patient.   11:06 AM I spoke with Dr. Rodriguez, hospitalist.     89 y/o M with h/o chronic a fib on eliquis, suprapubic catheter, HTN who presents with chest pain. Developed mid chest discomfort this morning around 8 am. Given aspirin and nitroglycerin in route, unclear if this really had any change. Symptoms are minimal on time of arrival. Dx includes but not limited to ACS, PE (unlikely no pleuritic chest pain and has been compliant with DOAC), dissection (unlikely not severe ripping pain, no widended mediastinum on CXR), GERD, pneumonia among others.  EKG shows a fib with RBBB, some lateral ST depression. Initial troponin 49, repeat pending at time of admission. Hgb found to be 9.4 from 13.8 prior. Does note 2 weeks ago had black stools, since then have been normal, no blood. He did have some wheezing on the left side on arrival, improved with duoneb. While getting neb did have brief run of vtach. CXR shows findings of bronchopneumonia/possible aspiration, started on zosyn. Recommended admission and he was accepted by Dr. Rodriguez for cardiac telemetry admission    Medical Decision Making    History:    Supplemental history from: Documented in chart, if applicable    External Record(s) reviewed: Documented in chart, if applicable.    Work Up:    Chart documentation includes differential considered and any EKGs or imaging interpreted by provider.    In additional to work up documented, I considered the  following work up: Documented in chart, if applicable.    External consultation:    Discussion of management with another provider: Documented in chart, if applicable    Complicating factors:    Care impacted by chronic illness: Anticoagulated State and Hypertension    Care affected by social determinants of health: Access to Medical Care    Disposition considerations: Admit.      MEDICATIONS GIVEN IN THE EMERGENCY:  Medications   lidocaine 1 % 0.1-1 mL (has no administration in time range)   lidocaine (LMX4) cream (has no administration in time range)   sodium chloride (PF) 0.9% PF flush 3 mL (3 mLs Intracatheter $Given 6/21/23 1303)   sodium chloride (PF) 0.9% PF flush 3 mL (has no administration in time range)   melatonin tablet 1 mg (has no administration in time range)   ondansetron (ZOFRAN ODT) ODT tab 4 mg (has no administration in time range)     Or   ondansetron (ZOFRAN) injection 4 mg (has no administration in time range)   acetaminophen (TYLENOL) tablet 650 mg (has no administration in time range)     Or   acetaminophen (TYLENOL) Suppository 650 mg (has no administration in time range)   calcium carbonate (TUMS) chewable tablet 500 mg (has no administration in time range)   piperacillin-tazobactam (ZOSYN) 3.375 g vial to attach to  mL bag (has no administration in time range)   apixaban ANTICOAGULANT (ELIQUIS) tablet 5 mg ( Oral Automatically Held 6/24/23 2000)   metoprolol tartrate (LOPRESSOR) tablet 50 mg (has no administration in time range)   pantoprazole (PROTONIX) IV push injection 40 mg (40 mg Intravenous $Given 6/21/23 1258)   glucose gel 15-30 g (has no administration in time range)     Or   dextrose 50 % injection 25-50 mL (has no administration in time range)     Or   glucagon injection 1 mg (has no administration in time range)   insulin aspart (NovoLOG) injection (RAPID ACTING) (has no administration in time range)   nitroGLYcerin (NITROSTAT) sublingual tablet 0.4 mg (0.4 mg Sublingual  "$Given 6/21/23 1424)   isosorbide mononitrate (IMDUR) 24 hr tablet 30 mg (30 mg Oral $Given 6/21/23 1522)   doxycycline (VIBRAMYCIN) 100 mg vial to attach to  mL bag (has no administration in time range)   hydrALAZINE (APRESOLINE) injection 10 mg (has no administration in time range)   ipratropium - albuterol 0.5 mg/2.5 mg/3 mL (DUONEB) neb solution 3 mL (3 mLs Nebulization $Given 6/21/23 0916)   piperacillin-tazobactam (ZOSYN) 3.375 g vial to attach to  mL bag (3.375 g Intravenous $Started 6/21/23 1302)   aspirin (ASA) chewable tablet 162 mg (162 mg Oral $Given 6/21/23 1257)       NEW PRESCRIPTIONS STARTED AT TODAY'S ER VISIT:  New Prescriptions    No medications on file        =================================================================  HPI    Patient information was obtained from: Patient and RN/EMS  Use of : N/A      David Dow is a 89 year old male with a past medical history of generalized muscle weakness, chronic atrial fibrillation, anticoagulated, and hypertension, who presents with chest pain.    Around 0800 patient reports sitting down to eat breakfast when he had a sudden onset of 5/10 chest \"discomfort.\" He reports the pain being in the middle of his chest. En route, EMS gave patient 324 aspirin and nitroglycerin and patient reports slight relief of symptoms. Per EMS, patient's blood sugar 256. Patient also has history of atrial fibrillation. Patient also states having some slight shortness of breath and nausea associated with the chest discomfort. Yesterday noted to be more short of breath with exertion.    Denies any recent sickness, vomiting, diarrhea, abdominal pain, fever, or cough. Denies history of heart attack.     Otherwise in normal state of health. No further concerns at this time.       REVIEW OF SYSTEMS   All systems reviewed, please see HPI for pertinent findings    PAST MEDICAL HISTORY:  Past Medical History:   Diagnosis Date     Anticoagulated by " anticoagulation treatment 6/26/2022     Chronic atrial fibrillation (H) 6/26/2022     HTN (hypertension) 6/26/2022     Suprapubic catheter (H) 6/26/2022       PAST SURGICAL HISTORY:  No past surgical history on file.    CURRENT MEDICATIONS:      Current Facility-Administered Medications:      acetaminophen (TYLENOL) tablet 650 mg, 650 mg, Oral, Q4H PRN **OR** acetaminophen (TYLENOL) Suppository 650 mg, 650 mg, Rectal, Q4H PRN, David Rodriguez DO     [Held by provider] apixaban ANTICOAGULANT (ELIQUIS) tablet 5 mg, 5 mg, Oral, BID, David Rodriguez DO     calcium carbonate (TUMS) chewable tablet 500 mg, 500 mg, Oral, TID PRN, David Rodriguez DO     glucose gel 15-30 g, 15-30 g, Oral, Q15 Min PRN **OR** dextrose 50 % injection 25-50 mL, 25-50 mL, Intravenous, Q15 Min PRN **OR** glucagon injection 1 mg, 1 mg, Subcutaneous, Q15 Min PRN, David Rodriguez DO     doxycycline (VIBRAMYCIN) 100 mg vial to attach to  mL bag, 100 mg, Intravenous, Q12H, David Rodriguez DO     hydrALAZINE (APRESOLINE) injection 10 mg, 10 mg, Intravenous, Q6H PRN, David Rodriguez DO     insulin aspart (NovoLOG) injection (RAPID ACTING), 1-4 Units, Subcutaneous, Q4H, David Rodriguez DO     isosorbide mononitrate (IMDUR) 24 hr tablet 30 mg, 30 mg, Oral, Daily, Scotty Glover DO, 30 mg at 06/21/23 1522     lidocaine (LMX4) cream, , Topical, Q1H PRN, David Rodriguez DO     lidocaine 1 % 0.1-1 mL, 0.1-1 mL, Other, Q1H PRN, David Rodriguez DO     melatonin tablet 1 mg, 1 mg, Oral, At Bedtime PRN, David Rodriguez DO     metoprolol tartrate (LOPRESSOR) tablet 50 mg, 50 mg, Oral, BID, David Rodriguez DO     nitroGLYcerin (NITROSTAT) sublingual tablet 0.4 mg, 0.4 mg, Sublingual, Q5 Min PRN, David Rodriguez DO, 0.4 mg at 06/21/23 1426     ondansetron (ZOFRAN ODT) ODT tab 4 mg, 4 mg, Oral, Q6H PRN **OR** ondansetron (ZOFRAN) injection 4 mg, 4 mg, Intravenous, Q6H PRN, David Rodriguez  DO Gunner     pantoprazole (PROTONIX) IV push injection 40 mg, 40 mg, Intravenous, Q12H, David Rodriguez DO, 40 mg at 06/21/23 1258     piperacillin-tazobactam (ZOSYN) 3.375 g vial to attach to  mL bag, 3.375 g, Intravenous, Q8H, David Rodriguez DO     sodium chloride (PF) 0.9% PF flush 3 mL, 3 mL, Intracatheter, Q8H, David Rodriguez DO, 3 mL at 06/21/23 1303     sodium chloride (PF) 0.9% PF flush 3 mL, 3 mL, Intracatheter, q1 min prn, David Rodriguez DO    Current Outpatient Medications:      apixaban ANTICOAGULANT (ELIQUIS) 5 MG tablet, Take 5 mg by mouth 2 times daily, Disp: , Rfl:      furosemide (LASIX) 40 MG tablet, Take 40 mg by mouth daily, Disp: , Rfl:      metoprolol tartrate (LOPRESSOR) 50 MG tablet, Take 50 mg by mouth 2 times daily, Disp: , Rfl:      pantoprazole (PROTONIX) 20 MG EC tablet, Take 20 mg by mouth daily, Disp: , Rfl:      potassium chloride ER (MICRO-K) 10 MEQ CR capsule, Take 10 mEq by mouth daily, Disp: , Rfl:      vitamin D3 (CHOLECALCIFEROL) 50 mcg (2000 units) tablet, Take 1 tablet by mouth daily, Disp: , Rfl:     ALLERGIES:  No Known Allergies    FAMILY HISTORY:  No family history on file.    SOCIAL HISTORY:   Social History     Socioeconomic History     Marital status:        PHYSICAL EXAM:    Vitals: /67   Pulse 84   Temp 98  F (36.7  C) (Oral)   Resp 18   SpO2 96%    Constitutional: Well developed, well nourished. No acute distress.  HENT: Normocephalic, atraumatic. Neck-gross ROM intact.   Eyes: Pupils mid-range, sclera white  Respiratory: CTAB, no respiratory distress, speaks full sentences easily.  Cardiovascular: Normal heart rate, irregularly irregular rhythm  GI: Soft, not distended, not tender to palpation  Musculoskeletal: Moving all 4 extremities intentionally and without pain. No obvious deformity.  Skin: Warm, dry.  Neurologic: Alert & oriented, speech clear, CNs grossly intact, moving all extremities    LAB:  All pertinent  labs reviewed and interpreted.  Labs Ordered and Resulted from Time of ED Arrival to Time of ED Departure   COMPREHENSIVE METABOLIC PANEL - Abnormal       Result Value    Sodium 141      Potassium 3.8      Chloride 102      Carbon Dioxide (CO2) 29      Anion Gap 10      Urea Nitrogen 23.0      Creatinine 1.02      Calcium 8.9      Glucose 183 (*)     Alkaline Phosphatase 76      AST 15      ALT 7      Protein Total 6.7      Albumin 4.1      Bilirubin Total 0.9      GFR Estimate 69     TROPONIN T, HIGH SENSITIVITY - Abnormal    Troponin T, High Sensitivity 49 (*)    CBC WITH PLATELETS AND DIFFERENTIAL - Abnormal    WBC Count 6.4      RBC Count 3.98 (*)     Hemoglobin 9.4 (*)     Hematocrit 31.8 (*)     MCV 80      MCH 23.6 (*)     MCHC 29.6 (*)     RDW 16.7 (*)     Platelet Count 126 (*)     % Neutrophils 66      % Lymphocytes 20      % Monocytes 13      % Eosinophils 0      % Basophils 0      % Immature Granulocytes 1      NRBCs per 100 WBC 0      Absolute Neutrophils 4.2      Absolute Lymphocytes 1.3      Absolute Monocytes 0.8      Absolute Eosinophils 0.0      Absolute Basophils 0.0      Absolute Immature Granulocytes 0.1      Absolute NRBCs 0.0     TROPONIN T, HIGH SENSITIVITY - Abnormal    Troponin T, High Sensitivity 137 (*)    NT PROBNP INPATIENT - Abnormal    N terminal Pro BNP Inpatient 3,514 (*)    PROCALCITONIN - Abnormal    Procalcitonin 0.07 (*)    HEMOGLOBIN A1C - Abnormal    Hemoglobin A1C 6.0 (*)    LACTIC ACID WHOLE BLOOD - Abnormal    Lactic Acid 4.8 (*)    GLUCOSE BY METER - Abnormal    GLUCOSE BY METER POCT 159 (*)    IRON AND IRON BINDING CAPACITY - Abnormal    Iron 31 (*)     Iron Binding Capacity 389      Iron Sat Index 8 (*)    BLOOD GAS VENOUS - Abnormal    pH Venous 7.38      pCO2 Venous 51 (*)     pO2 Venous 41      Bicarbonate Venous 30      Base Excess/Deficit (+/-) 4.5      Oxyhemoglobin Venous 66.6 (*)     O2 Sat, Venous 68.0 (*)    MAGNESIUM - Normal    Magnesium 2.0     GLUCOSE  MONITOR NURSING POCT   GLUCOSE MONITOR NURSING POCT   LACTIC ACID WHOLE BLOOD   FERRITIN   RETICULOCYTE COUNT   HEMOGLOBIN   LACTATE DEHYDROGENASE   HAPTOGLOBIN   COVID-19 VIRUS (CORONAVIRUS) BY PCR   DIRECT ANTIGLOBULIN TEST   RESPIRATORY AEROBIC BACTERIAL CULTURE   MRSA MSSA PCR, NASAL SWAB       RADIOLOGY:  Chest XR,  PA & LAT   Final Result   IMPRESSION: Patchy airspace opacities again noted in the right lower lobe with new opacities in the inferior right upper lobe abutting the fissure. There is a small right effusion. Patchy airspace opacities also noted in the left lower lobe. Findings    suggest a bronchopneumonia.       Query any risk factors for aspiration?      Heart and pulmonary vascularity are normal without signs of failure. Severe degenerative changes in the right shoulder prior left shoulder arthroplasty.      Echocardiogram Complete    (Results Pending)       EKG:   Performed at: 08:59  Impression: atrial fibrillation  RBBB  Abnormal ECG  Rate: 87 bpm  Rhythm: atrial fibrillation   QRS Interval: 144 ms  QTc Interval: 4848 ms  Comparison: No change from prior  I have independently reviewed and interpreted the EKG(s) documented above.     PROCEDURES:   Procedures     I, Katerina Nieto, am serving as a scribe to document services personally performed by Dr. Prudence Gomez based on my observation and the provider's statements to me. I, Prudence Gomez MD attest that Katerina Nieto is acting in a scribe capacity, has observed my performance of the services and has documented them in accordance with my direction.    Prudence Gomez M.D.  Emergency Medicine  [facility] EMERGENCY DEPARTMENT  [facility] [facility] [address]  [phone]  Dept: [phone]         Prudence Gomez MD  06/21/23 1903

## 2023-06-21 NOTE — H&P
[facility]    History and Physical - Hospitalist Service       Date of Admission:  6/21/2023    Assessment & Plan      Chest pain: Concern for NSTEMI.  - Cardiology consultation appreciated.  - Chronically on Eliquis which currently is on hold.  No heparin drip at this time.  - Nitroglycerin, aspirin, beta-blocker  - Check lipid panel  - We will make n.p.o. at midnight in case angiogram considered  - TTE  -Telemetry    Chronic atrial fibrillation: Rate controlled  - Holding Eliquis for today  - Metoprolol  - Telemetry    Acute anemia and chronic thrombocytopenia:  - Given recent episode of melena that he reports 1 to 2 weeks ago and current hemoglobin in the 9 range from baseline of 11-12 range but has had no recent in the past several days of bloody or black stool on Eliquis is concerning for occult intermittent gastrointestinal bleed.  - Holding Eliquis for tonight and monitor for any evidence of bleeding  - Recheck hemoglobin this evening  - Check iron panel and hemolysis labs  - CBC in a.m.  -Recheck hemoglobin this evening  -  PPI    Acute hypoxic pulmonary insufficiency due to CAP and concern for possible pulmonary edema given NSTEMI, BNP elevation  - Sputum culture, respiratory panel PCR, procalcitonin  - Swallow study performed by speech therapy and no evidence of aspiration.  - Check nasal MRSA  -Empiric broad-spectrum antimicrobial therapy with doxycycline, Zosyn.  Taper antibiotics pending cultures.  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    Hypertension:  - Metoprolol, isosorbide mononitrate, hydralazine.         Diet: NPO for Medical/Clinical Reasons Except for: No Exceptions    DVT Prophylaxis: Pneumatic Compression Devices  Paul Catheter: Not present  Lines: None     Cardiac Monitoring: ACTIVE order. Indication: Tachyarrhythmias, acute (48  "hours)  Code Status: No CPR- Do NOT Intubate      Clinically Significant Risk Factors Present on Admission               # Drug Induced Coagulation Defect: home medication list includes an anticoagulant medication  # Thrombocytopenia: Lowest platelets = 126 in last 2 days, will monitor for bleeding   # Hypertension: Noted on problem list               Disposition Plan      Expected Discharge Date: 06/23/2023                  David Rodriguez DO, DO  Hospitalist Service  [place]  Securely message with [employer] (more info)  Text page via [employer] Paging/Directory     ______________________________________________________________________    Chief Complaint     Chest pain    History of Present Illness   David Dow is a [age] year old male with a past medical history of chronic atrial fibrillation on eliquis,  hypertension, who presents, chronic suprapubic catheter presented to the ED with chest pain.  Patient reports that at around 0800 this morning while sitting down to eat breakfast he had a sudden onset of 5/10 chest \"discomfort.\" He reports the discomfort in the middle of his chest. EMS summoned and was given 1 nitroglycerin and patient reported slight relief of symptoms. Per EMS, patient's blood sugar 256.  He also had some SOB, and nausea.     He did report 1 -2 weeks ago had some black stool but more recents BM's have not been bloody or black.  Patient also reports he has had nonproductive cough for the past few weeks.  No dysphagia.  No reported fevers.  No diarrhea.  No vomiting.    In the emergency department patient noted to be hypertensive.  He had a episode getting up to the bathroom with exertional shortness of breath, substernal chest pain and associated diaphoresis improved after laying back down on the gurney.  ED EKG x2 showed atrial fibrillation and right bundle branch block.  Initial troponin 49 with second troponin 137.  Hemoglobin 9.4 down from baseline of 12-13 6 to 12 " months ago.  Platelet count 126.  Glucose 183 with remainder of CMP normal.  BNP 3,514.  Chest x-ray 2 view showed patchy airspace opacities noted in the right lower lobe with new opacities in the inferior right upper lobe abutting the fissure, small right effusion, patchy airspace opacities left lower lobe.  Findings suggestive of possible bronchopneumonia.  Patient admitted for further evaluation and management.      Past Medical History    Past Medical History:   Diagnosis Date     Anticoagulated by anticoagulation treatment 6/26/2022     Chronic atrial fibrillation (H) 6/26/2022     HTN (hypertension) 6/26/2022     Suprapubic catheter (H) 6/26/2022       Past Surgical History   No past surgical history on file.    Prior to Admission Medications   Prior to Admission Medications   Prescriptions Last Dose Informant Patient Reported? Taking?   apixaban ANTICOAGULANT (ELIQUIS) 5 MG tablet 6/20/2023 at PM  Yes Yes   Sig: Take 5 mg by mouth 2 times daily   furosemide (LASIX) 40 MG tablet 6/20/2023 at am  Yes Yes   Sig: Take 40 mg by mouth daily   metoprolol tartrate (LOPRESSOR) 50 MG tablet 6/20/2023 at pm  Yes Yes   Sig: Take 50 mg by mouth 2 times daily   pantoprazole (PROTONIX) 20 MG EC tablet 6/20/2023 at am  Yes Yes   Sig: Take 20 mg by mouth daily   potassium chloride ER (MICRO-K) 10 MEQ CR capsule 6/20/2023 at am  Yes Yes   Sig: Take 10 mEq by mouth daily   vitamin D3 (CHOLECALCIFEROL) 50 mcg (2000 units) tablet 6/20/2023 at am  Yes Yes   Sig: Take 1 tablet by mouth daily      Facility-Administered Medications: None        Physical Exam   Vital Signs: Temp: 98.2  F (36.8  C) Temp src: Oral BP: (!) 158/79 Pulse: 78   Resp: 21 SpO2: 95 %      Weight: 0 lbs 0 oz    Physical Examination:   General appearance - alert, and in no distress  Eyes - pupils equal and reactive, extraocular eye movements intact, sclera anicteric  Mouth - mucous membranes pasty, pharynx normal without lesions  Lungs -decreased at the bases,  no wheezing, nonlabored.  Heart -irregular, irregular, normal rate.  No peripheral edema.  Abdomen - soft, nontender, nondistended, no masses or organomegaly, BS+  Neurological - alert, oriented, normal speech, no focal findings or movement disorder noted  Skin -  No c/c/p    Lab/imaging reviewed.  EKG x2 personally reviewed.

## 2023-06-21 NOTE — ED NOTES
Bed: [facility]-H  Expected date: 6/21/23  Expected time:   Means of arrival:   Comments:  Johnson GONZALEZ CP

## 2023-06-21 NOTE — PROGRESS NOTES
Care Management Initial Consult    General Information  Assessment completed with: Patient,    Type of CM/SW Visit: Initial Assessment    Primary Care Provider verified and updated as needed:     Readmission within the last 30 days: no previous admission in last 30 days      Reason for Consult: discharge planning  Advance Care Planning: Advance Care Planning Reviewed: no concerns identified          Communication Assessment  Patient's communication style: spoken language (English or Bilingual)             Cognitive  Cognitive/Neuro/Behavioral: WDL                      Living Environment:   People in home: spouse  Inge  Current living Arrangements: assisted living  [facility] of Facility: [facility]   Able to return to prior arrangements: yes       Family/Social Support:  Care provided by: self, homecare agency  Provides care for: no one  Marital Status:   Wife, Children  Inge       Description of Support System: Supportive, Involved    Support Assessment: Adequate family and caregiver support    Current Resources:   Patient receiving home care services: No     Community Resources: None  Equipment currently used at home:    Supplies currently used at home: None    Employment/Financial:  Employment Status: retired        Financial Concerns: No concerns identified   Referral to Financial Worker: No       Does the patient's insurance plan have a 3 day qualifying hospital stay waiver?  No    Lifestyle & Psychosocial Needs:  Social Determinants of Health     Tobacco Use: Not on file   Alcohol Use: Not on file   Financial Resource Strain: Not on file   Food Insecurity: Not on file   Transportation Needs: Not on file   Physical Activity: Not on file   Stress: Not on file   Social Connections: Not on file   Intimate Partner Violence: Not on file   Depression: Not on file   Housing Stability: Not on file       Functional Status:  Prior to admission patient needed assistance:   Dependent ADLs:: Independent (gets  assistance with catheter care)  Dependent IADLs:: Independent, Transportation  Assesssment of Functional Status: At functional baseline    Mental Health Status:  Mental Health Status: No Current Concerns       Chemical Dependency Status:  Chemical Dependency Status: No Current Concerns             Values/Beliefs:  Spiritual, Cultural Beliefs, Caodaism Practices, Values that affect care:                 Additional Information:  Pt is a 89 year old male who was admitted for chest pain.    Pt lives in an assisted living with spouse.  He does not receive any services except assistance with his indwelling urinary catheter.  He is independent with ADLs and most IADLs.  Wife does receive services.. Plan is to return  To assisted living on discharge and family can provide transportation.      No CM needs noted at this time.  If pt should need TCU, [facility] does reserve a bed for their [facility] resident in their TCU    Mar Garza RN

## 2023-06-21 NOTE — ED NOTES
"Pt complained that he needed the restroom.  Brought to the restroom and assisted to sit on bed pan but complained that he can't do it.  Pt wanted to sit on the toiled to pass BM.  Assisted by this Rn and ZA Childers to the toilet.  When pt was attempting to pass BM pt complained of feeling dizzy and started to get more SOB.  Touching his chest complaining he can't  Catch his breath\".  Pt only pass gas and no BM came out.  Assisted back to bed and then hooked to the cardiac monitor.  O2 sats was still 98% but hooked 1 Lpm per NC per cannula for comfort.  Cold washcloth applied and informed Omid Birmingham RN and repeat ECG done.   "

## 2023-06-21 NOTE — ED NOTES
Assisted to bathroom with assist 2 personnel. Pt became sob, diaphoretic, and c/o substernal non radiating chest pain. EKG completed. Dr. Rodriguez notified.

## 2023-06-21 NOTE — ED NOTES
Writer assisting primary nurse.  Administered duoneb.  Shallow, diminished lung sounds with faint wheezing on left prior to neb.  After neb, wheezing resolved with slight increase in air movement.  Pt noted to have 5 beat run of v-tach at end of neb.  Provider notified.  See chart for rhythm.  Pt denied symptoms at that time.  Primary RN updated.

## 2023-06-21 NOTE — ED TRIAGE NOTES
Pt [facility] with chest pain that started at approx 0830 this morning. Pt describes pain as dull discomfort. PTA meds: 325mg ASA and nitroglycerin sublingual x4.         Triage Assessment     Row Name 06/21/23 0843       Triage Assessment (Adult)    Airway WDL WDL       Respiratory WDL    Respiratory WDL rhythm/pattern    Rhythm/Pattern, Respiratory shortness of breath       Skin Circulation/Temperature WDL    Skin Circulation/Temperature WDL WDL       Cardiac WDL    Cardiac WDL X;chest pain;rhythm    Pulse Rate & Regularity radial pulse irregular    Cardiac Rhythm Atrial fibrillation       Chest Pain Assessment    Chest Pain Location midsternal    Character aching

## 2023-06-22 NOTE — PROGRESS NOTES
HEART CARE CONSULTATON NOTE        Assessment/Recommendations   Assessment:   1.  Chest pain, anginal.  Likely secondary to obstructive coronary disease, worsened by significant anemia.  2.  Acute systolic congestive heart failure  3.  Chronic atrial fibrillation on Eliquis, holding Eliquis  4.  Melenic stools  5.  Severe iron deficiency anemia  Ferritin   Date Value Ref Range Status   06/21/2023 18 (L) 31 - 409 ng/mL Final   6.  Mild regurgitation, moderate, functional    Plan:   1.  Started imdur 30 mg   2.  Treat iron deficiency anemia with IV iron, ordered  3.   Metoprolol 50 mg BID    4.  Consider blood transfusion hemoglobin less than 8.  5.  Regular diet, at patient's request  6.  Start IV Lasix 20 mg twice daily, significant elevation right atrial pressures.    Conversation with the patient regarding his severe iron deficiency anemia, significant decline in left ventricular ejection fraction, moderate mitral regurgitation and pulm hypertension      Clinically Significant Risk Factors                # Thrombocytopenia: Lowest platelets = 126 in last 2 days, will monitor for bleeding   # Hypertension: Noted on problem list               History of Present Illness/Subjective    HPI: David Dow is a 89 year old male history of chronic atrial fibrillation on anticoagulation who presents to [facility] with chest pain which resolved with single dose of nitroglycerin via EMS found to have elevated troponin and ST depressions in lateral leads of his EKG.  On arrival he was hypertensive with systolic blood pressure of 180.  He did not take his metoprolol past 2 days per his son.  More concerning is he also had intermittent dark stools over the past 2 weeks.  He does have anemia.  MCW is low at 80.    S: Patient main concern tonight is that he has not had a meal and has been n.p.o.  I was not aware of this.  Ordered regular diet.  Echocardiogram demonstrated significant reduction in left ventricular  ejection fraction.  We did start him on Eissler mononitrate to improve coronary perfusion.  Continues to have significant anemia.  Ferritin was 18 signifying significant iron deficiency anemia.  We will start iron replacement therapy IV.  Holding anticoagulation due to iron deficiency anemia and recent dark stools.      At this point patient does not want undergo invasive procedures.  Given his concern for chronic GI bleeding would not advise percutaneous coronary mention either.  Focus on medical therapy management optimizing his coronary ischemia through balancing demand.         Physical Examination  Review of Systems   VITALS: /79 (BP Location: Left arm)   Pulse 60   Temp 98.1  F (36.7  C) (Oral)   Resp 20   SpO2 98%   BMI: There is no height or weight on file to calculate BMI.  Wt Readings from Last 3 Encounters:   06/26/22 61.2 kg (135 lb)       Intake/Output Summary (Last 24 hours) at 6/21/2023 1428  Last data filed at 6/21/2023 1215  Gross per 24 hour   Intake --   Output 300 ml   Net -300 ml     Upright in upright in bed    ENT/Mouth: membranes moist, no oral lesions or bleeding gums.      EYES:  no scleral icterus, normal conjunctivae   Neck: no carotid bruits or thyromegaly   Chest/Lungs:   Faint rales in the left   Cardiovascular:   Irregular.  No significant pitting edema   Abdomen:  no organomegaly suprapubic catheter in place..    Extremities: no cyanosis or clubbing   Skin: no xanthelasma, warm.    Neurologic: normal  bilateral, no tremors     Psychiatric: alert and oriented, calm     Review Of Systems  Skin: negative  Eyes: negative  Ears/Nose/Throat: negative  Respiratory: Dyspnea   Cardiovascular: positive for chest pain, dyspnea  Gastrointestinal: negative  Genitourinary: negative  Musculoskeletal: negative  Neurologic: negative  Psychiatric: negative  Hematologic/Lymphatic/Immunologic: negative  Endocrine: negative          Lab Results    Chemistry/lipid CBC Cardiac  Enzymes/BNP/TSH/INR   No results for input(s): CHOL, HDL, LDL, TRIG, CHOLHDLRATIO in the last 41958 hours.  No results for input(s): LDL in the last 39289 hours.  Recent Labs   Lab Test 06/21/23  1354 06/21/23  0913   NA  --  141   POTASSIUM  --  3.8   CHLORIDE  --  102   CO2  --  29   * 183*   BUN  --  23.0   CR  --  1.02   GFRESTIMATED  --  69   JOSESITO  --  8.9     Recent Labs   Lab Test 06/21/23  0913 12/07/22  1025 07/06/22  0725   CR 1.02 1.18* 0.98     Recent Labs   Lab Test 06/21/23  0913   A1C 6.0*          Recent Labs   Lab Test 06/21/23  0913   WBC 6.4   HGB 9.4*   HCT 31.8*   MCV 80   *     Recent Labs   Lab Test 06/21/23  0913 12/07/22  1025 07/06/22  0725   HGB 9.4* 13.8 13.0*    Recent Labs   Lab Test 06/26/22  1235   TROPONINI 0.03     Recent Labs   Lab Test 06/21/23  1148   NTBNPI 3,514*     Recent Labs   Lab Test 09/15/21  0655   TSH 2.43     No results for input(s): INR in the last 93582 hours.     Medical History  Surgical History Family History Social History   Past Medical History:   Diagnosis Date     Anticoagulated by anticoagulation treatment 6/26/2022     Chronic atrial fibrillation (H) 6/26/2022     HTN (hypertension) 6/26/2022     Suprapubic catheter (H) 6/26/2022     No past surgical history on file.  No family history on file.     Social History     Socioeconomic History     Marital status:      Spouse name: Not on file     Number of children: Not on file     Years of education: Not on file     Highest education level: Not on file   Occupational History     Not on file   Tobacco Use     Smoking status: Not on file     Smokeless tobacco: Not on file   Substance and Sexual Activity     Alcohol use: Not on file     Drug use: Not on file     Sexual activity: Not on file   Other Topics Concern     Not on file   Social History Narrative     Not on file     Social Determinants of Health     Financial Resource Strain: Not on file   Food Insecurity: Not on file   Transportation  Needs: Not on file   Physical Activity: Not on file   Stress: Not on file   Social Connections: Not on file   Intimate Partner Violence: Not on file   Housing Stability: Not on file         Medications  Allergies   No current outpatient medications on file.      No Known Allergies      Scotty Glover DO

## 2023-06-22 NOTE — PLAN OF CARE
Problem: Plan of Care - These are the overarching goals to be used throughout the patient stay.    Goal: Absence of Hospital-Acquired Illness or Injury  Intervention: Identify and Manage Fall Risk  Recent Flowsheet Documentation  Taken 6/21/2023 1700 by Carmela Becerra RN  Safety Promotion/Fall Prevention:    activity supervised    nonskid shoes/slippers when out of bed    room near nurse's station     Problem: Risk for Delirium  Goal: Improved Attention and Thought Clarity  Intervention: Maximize Cognitive Function  Recent Flowsheet Documentation  Taken 6/21/2023 1700 by Carmela Becerra RN  Reorientation Measures:    clock in view    familiar social contact encouraged   Goal Outcome Evaluation:       Pt is progressing with these goals.  Denies any pain or sob.  VSS.  Tolerating cares well.  Continues to be atrial fib without any symptoms.  Pt refused nose swabs for covid and mrsa at this time.

## 2023-06-22 NOTE — PLAN OF CARE
Pt admitted from ED this shift.  Pt alert and oriented.  Pt pivot transferred into bed with some difficulty.  Pt states feeling weak and exhausted.  Son here at bedside.  Pt settled into bed and allowed time to rest.    Oksana Dawkins RN

## 2023-06-22 NOTE — PROGRESS NOTES
"   06/22/23 0900   Appointment Info   Signing Clinician's Name / Credentials (PT) Maribel Logan, PT   Living Environment   People in Home spouse  (His wife does have her own medical issues per the pt's son but she does help the pt as well.  .)   Current Living Arrangements assisted living   Home Accessibility no concerns   Living Environment Comments walk in shower with GB.  Pt does have a lift chair that he stays in all day and sleeps in as well.   Self-Care   Usual Activity Tolerance moderate   Current Activity Tolerance moderate   Equipment Currently Used at Home shower chair;walker, rolling  (4WW, life line)   Fall history within last six months no   Activity/Exercise/Self-Care Comment Pt walks to the bathroom with the 4WW indep and does need assist with transfers and toileting.  Pt needs assist with driving.  (They do call down to get meals and does have assist to get delivered to them.)   General Information   Onset of Illness/Injury or Date of Surgery 06/21/23   Referring Physician Dr forrest Rodriguez   Patient/Family Therapy Goals Statement (PT) Pt wants to go home.   Pertinent History of Current Problem (include personal factors and/or comorbidities that impact the POC) Per the chart, David Dow is a [age] year old male with a past medical history of chronic atrial fibrillation on eliquis,  hypertension, who presents, chronic suprapubic catheter presented to the ED with chest pain.  Patient reports that at around 0800 this morning while sitting down to eat breakfast he had a sudden onset of 5/10 chest \"discomfort.\" He reports the discomfort in the middle of his chest. EMS summoned and was given 1 nitroglycerin and patient reported slight relief of symptoms. Per EMS, patient's blood sugar 256.  He also had some SOB, and nausea.   Weight-Bearing Status - LLE weight-bearing as tolerated   Weight-Bearing Status - RLE weight-bearing as tolerated   Cognition   Orientation Status (Cognition) oriented to;person;time "   Pain Assessment   Patient Currently in Pain No  (Pt said he does have increased anxiety with mobility.)   Integumentary/Edema   Integumentary/Edema Comments Pt does have a urine leg bag.   Posture    Posture Comments Poor posture.   Range of Motion (ROM)   Range of Motion ROM is WNL   ROM Comment bilat LEs WFL.   Strength (Manual Muscle Testing)   Strength Comments Pt is able to WB for mobility.   Bed Mobility   Comment, (Bed Mobility) Supine>sit with min A x 1. HOB elevated and the pt did use the rail.  Sit>supine with CGA x 1 with cues for technique. .   Transfers   Comment, (Transfers) Bed <>stand with min A x 1 with his 4WW  (Toilet transfer with min A1)   Gait/Stairs (Locomotion)   [place] Level (Gait) minimum assist (75% patient effort);verbal cues;1 person to manage equipment   Assistive Device (Gait) walker, 4-wheeled   Distance in Feet 10'   Distance in Feet (Gait) 10'x2 and standing.   Pattern (Gait) swing-through   Deviations/Abnormal Patterns (Gait) jade decreased;gait speed decreased   Balance   Balance Comments min A with his 4WW   Sensory Examination   Sensory Perception WNL   Sensory Perception Comments [place]   Clinical Impression   Criteria for Skilled Therapeutic Intervention Yes, treatment indicated   PT Diagnosis (PT) impaired  functional mobility   Influenced by the following impairments weakness, dec bal, dec endurance.   Functional limitations due to impairments bed mobility, transfers,gait.   Clinical Presentation (PT Evaluation Complexity) Stable/Uncomplicated   Clinical Presentation Rationale Pt presents medically diagnosed.   Clinical Decision Making (Complexity) low complexity   Planned Therapy Interventions (PT) balance training;bed mobility training;gait training;home exercise program;strengthening;transfer training   Anticipated Equipment Needs at Discharge (PT) walker, rolling  (4WW)   Risk & Benefits of therapy have been explained evaluation/treatment results  reviewed;care plan/treatment goals reviewed;risks/benefits reviewed;patient;participants voiced agreement with care plan;son   PT Total Evaluation Time   PT Eval, Low Complexity Minutes (14593) 10   Physical Therapy Goals   PT Frequency Daily   PT Predicted Duration/Target Date for Goal Attainment 06/29/23   PT Goals Bed Mobility;Gait;Transfers;PT Goal 1   PT: Bed Mobility Supervision/stand-by assist;Supine to/from sit;Rolling   PT: Transfers Supervision/stand-by assist;Sit to/from stand;Bed to/from chair;Assistive device   PT: Gait Minimal assist;Rolling walker;100 feet   PT: Goal 1 Pt to florentino bilat LE ex x 20 reps to increase strength for mobility.   Interventions   Interventions Quick Adds Gait Training   Gait Training   Gait Training Minutes (44497) 8   Symptoms Noted During/After Treatment (Gait Training) fatigue   Treatment Detail/Skilled Intervention Pt did have O2 at 2 L and needed assist with the O2.  Pt did have increased anxiety with walking and did get tired with walking.  (HR 71 resting and 95 after walking O2 SATs stable .)   [place] Level (Gait Training) minimum assist (75% patient effort)   Physical Assistance Level (Gait Training) verbal cues;1 person assist   Weight Bearing (Gait Training) weight-bearing as tolerated   Assistive Device (Gait Training) rolling walker  (his 4WW)   Pattern Analysis (Gait Training) swing-through gait   Gait Analysis Deviations decreased jade;decreased step length;decreased toe-to-floor clearance   PT Discharge Planning   PT Plan bed mobility, transfers and gait with his 4WW, LE ex.   PT Discharge Recommendation (DC Rec) Transitional Care Facility   PT Rationale for DC Rec Pt does need assist of one with bed mobility, transfers and gait  He does fatique quickly.  TCU is recommended.  Pt may need increased services in his home setting.   PT Brief overview of current status PT eval. gait w FWW 10'x2 with min A x 1. bed mobility and transfers with min A x1   Total  Session Time   Timed Code Treatment Minutes 8   Total Session Time (sum of timed and untimed services) 18

## 2023-06-22 NOTE — PLAN OF CARE
"Goal Outcome Evaluation:      Problem: Plan of Care - These are the overarching goals to be used throughout the patient stay.    Goal: Patient-Specific Goal (Individualized)  Description: You can add care plan individualizations to a care plan. Examples of Individualization might be:  \"Parent requests to be called daily at 9am for status\", \"I have a hard time hearing out of my right ear\", or \"Do not touch me to wake me up as it startles me\".  Outcome: Progressing     Problem: Plan of Care - These are the overarching goals to be used throughout the patient stay.    Goal: Optimal Comfort and Wellbeing  Outcome: Progressing     Problem: Risk for Delirium  Goal: Improved Behavioral Control  Outcome: Progressing     Problem: Risk for Delirium  Goal: Improved Attention and Thought Clarity  Outcome: Progressing    A/O x4. VSS. Sat 95% on 2L oxygen. Denied any chest pain. Able to walk to bathroom with OT/PT staff. Transferred to  at 1310. Report given to receiving RN.                            " "Jefferson Memorial Hospital [employer]      Patient joined through [employer]    12/6/2022    10:24 AM 1/5/2023     9:39 AM 2/8/2023    10:31 AM 2/15/2023     8:32 AM 3/1/2023     9:32 10/14/2022     9:38 AM 10/31/2022     8:43 AM 12/6/2022    10:25 AM 1/6/2023     9:45 AM 2/8/2023    10:31 AM 3/1/2023     9:33 Dead Description (Lifetime)   hospitalized in 2018/2019 7/29/2021    10:18 AM 9/18/2021     4:43 PM 11/3/2021    11:36 AM 3/31/2022    11:39 AM 4/20/2022     5:45 PM 5/13/2022    11:04 [facility] [place] ([place]): [phone] - Specialized mental health emergency area designed to be [facility] [place] [facility]    [facility] ([place]): [phone]    [facility] [place] [facility] ([place]): [phone]    [facility] ([place]): [phone]    Mental Health Crisis Numbers:     National Suicide Hotline: 988    Crisis Text Line: Text [place] to 132819     [facility] Crisis:  951- 484-4373    Peer Support (non-crisis)    [place] Warmline: 550.452.5076    Or text  Support  to 69703      JONES Naranjo April 12, 2023     -------------------------------------------------------------------------------------------------          Individual Treatment Plan    Patient's Name: Opal Carlos  YOB: 1994    Date of Creation: 7/15/5/27/2022 8/20/2022 12/6/2022 12/6/2022 12/6/2022 3/15/2023 3/15/2023   JONES Naranjo  March 15, 2023       ---------------------------------------------------------------------------------------------------------------      SAFETY PLAN     Opal Carlos  : 1994  MRN:  [id number]       [facility] Number: [phone]  [Opal Carlos (virtual visit)  Completed with mental health provider/psychotherapist- Jodi Combs [facility]   [email or web address] or [email or web address].

## 2023-06-22 NOTE — PROGRESS NOTES
06/22/23 0850   Appointment Info   Signing Clinician's Name / Credentials (OT) Prudence Reyes,OTR/L   Living Environment   People in Home spouse   Current Living Arrangements assisted living   Home Accessibility no concerns   Transportation Anticipated family or friend will provide   Self-Care   Equipment Currently Used at Home shower chair  (walk in shower, lifeline, catheter)   Activity/Exercise/Self-Care Comment A w/ bathing, toileting, driving, meals, ambulates to BR w/ 4WW, per family pt spends most of time in recliner,   General Information   Onset of Illness/Injury or Date of Surgery 06/21/23   Patient/Family Therapy Goal Statement (OT) none stated   Additional Occupational Profile Info/Pertinent History of Current Problem 89 y/o M with h/o chronic a fib on eliquis, suprapubic catheter, HTN who presents with chest pain. Developed mid chest discomfort .   Existing Precautions/Restrictions fall;oxygen therapy device and L/min   Cognitive Status Examination   Orientation Status person  (place and time NT)   Behavioral Issues   (cooperative)   Affect/Mental Status (Cognitive) WFL   Follows Commands WFL   Visual Perception   Visual Impairment/Limitations   (no c/o vision issues)   Range of Motion Comprehensive   General Range of Motion   (decreased AROM B UE's)   Strength Comprehensive (MMT)   General Manual Muscle Testing (MMT) Assessment   (WFL for ADLs)   Coordination   Upper Extremity Coordination No deficits were identified   Bed Mobility   Bed Mobility supine-sit;sit-supine   Supine-Sit [place] (Bed Mobility) contact guard   Sit-Supine [place] (Bed Mobility) contact guard   Transfers   Transfers toilet transfer;bed-chair transfer   Transfer Skill: Bed to Chair/Chair to Bed   Bed-Chair [place] (Transfers) minimum assist (75% patient effort)   Toilet Transfer   Type (Toilet Transfer) sit-stand;stand-sit   [place] Level (Toilet Transfer) minimum assist (75% patient effort)   Balance   Balance  Assessment standing balance: dynamic   Balance Comments WFL w/ AD   Activities of Daily Living   BADL Assessment/Intervention toileting  (sat on toilet, did not need to void)   Clinical Impression   Criteria for Skilled Therapeutic Interventions Met (OT) Yes, treatment indicated   OT Diagnosis decreased ADLs   OT Problem List-Impairments impacting ADL activity tolerance impaired;fear & anxiety;mobility;strength   Assessment of Occupational Performance 1-3 Performance Deficits   Identified Performance Deficits bed mobility, trsfs, toileting   Planned Therapy Interventions (OT) ADL retraining;strengthening;transfer training   Clinical Decision Making Complexity (OT) moderate complexity   Anticipated Equipment Needs Upon Discharge (OT)   (cont to assess)   Risk & Benefits of therapy have been explained evaluation/treatment results reviewed;care plan/treatment goals reviewed;participants included;patient;risks/benefits reviewed   OT Total Evaluation Time   OT Eval, Moderate Complexity Minutes (60297) 8   OT Goals   Therapy Frequency (OT) 5 times/wk   OT Predicted Duration/Target Date for Goal Attainment 06/28/23   OT Goals Transfers;Toilet Transfer/Toileting   OT: Transfer Modified independent   OT: Toilet Transfer/Toileting Modified independent   Interventions   Interventions Quick Adds Self-Care/Home Management   Self-Care/Home Management   Self-Care/Home Mgmt/ADL, Compensatory, Meal Prep Minutes (94529) 8   Symptoms Noted During/After Treatment (Meal Preparation/Planning Training) fatigue  (anxiety)   Treatment Detail/Skilled Intervention pt agreeable to OT session, lines/tubes mngmnt prior to OOB activity, o2 sats 99% on 2L, supine>sit CGA w/ cues for attn to direction, trsf bed>toilet w/ 4WW w/ Tarik w/ cues for hand placement and safety, pt w/ decreased activity tolerance for OOB tasks, cues for PLB, rests breaks and pacing, rtn to EOB w/ 4WW w/ CGA, sit>supine CGA,   OT Discharge Planning   OT Plan trsfs,  toileting, PLB, can add g/h or drsg, goal as needed,   OT Discharge Recommendation (DC Rec) Transitional Care Facility   OT Rationale for DC Rec Ax1 for ADLs and trsfs, decreased activity tolerance impacting ADL tasks, rec. continued OT either at TCU or if d/c home rec. home OT to improve strength/endurance  for ADL indep.   OT Brief overview of current status CGA functional mob. w/ 4WW, min A trsfs, decrease endurance   Total Session Time   Timed Code Treatment Minutes 8   Total Session Time (sum of timed and untimed services) 16

## 2023-06-22 NOTE — PROGRESS NOTES
Daily Progress Note        CODE STATUS:  No CPR- Do NOT Intubate    06/22/23  Assessment/Plan:  David Dow is a [age] year old male with a past medical history of chronic atrial fibrillation on eliquis,  hypertension, chronic suprapubic catheter presented to the ED with chest pain.    Chest pain: Concern for NSTEMI.  - Cardiology consultation appreciated.  - Chronically on Eliquis which currently is on hold.  No heparin drip at this time.  - Nitroglycerin, aspirin, beta-blocker  - Check lipid panel  - TTE- EF 40-45%, moderate concentric LVH, moderately severe MR, mild pulm hypertension  --Cont telemetry     Chronic atrial fibrillation: Rate controlled  - Holding Eliquis for today  - Metoprolol  - Telemetry     Acute anemia and chronic thrombocytopenia:  - Given recent episode of melena that he reports 1 to 2 weeks ago and current hemoglobin in the 9 range from baseline of 11-12 range but has had no recent in the past several days of bloody or black stool on Eliquis is concerning for occult intermittent gastrointestinal bleed.  - Holding Eliquis for tonight and monitor for any evidence of bleeding  - Recheck hemoglobin in am  - Mild iron deficiency noted on iron studies. No e/o hemolysis.   -  PPI     Acute hypoxic pulmonary insufficiency due to CAP and concern for possible pulmonary edema given NSTEMI, BNP elevation  - Sputum culture, respiratory panel PCR, procalcitonin  - Swallow study performed by speech therapy and no evidence of aspiration.  - Check nasal MRSA- pending  -- Empiric broad-spectrum antimicrobial therapy with doxycycline, Zosyn.  Taper antibiotics pending cultures.  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     Hypertension:  - Metoprolol, isosorbide mononitrate, hydralazine.     CKD-3  -- Creatine of 1.3 noted this am. Baseline seems to be around 1.1-1.2  -- Monitor      Diet: NPO  for Medical/Clinical Reasons Except for: No Exceptions    DVT Prophylaxis: Pneumatic Compression Devices  Paul Catheter: Not present  Lines: None     Cardiac Monitoring: ACTIVE order. Indication: Tachyarrhythmias, acute (48 hours)  Code Status: No CPR- Do NOT Intubate      Clinically Significant Risk Factors Present on Admission               # Drug Induced Coagulation Defect: home medication list includes an anticoagulant medication  # Thrombocytopenia: Lowest platelets = 126 in last 2 days, will monitor for bleeding   # Hypertension: Noted on problem list               Disposition; 1-2 days  Barrier to discharge; Cardiac work up for chest pain     LOS: 1 day     Subjective:  Interval History: Patient seen and examined. Notes, labs, imaging reports personally reviewed. Patient is new to me today. No acute issues overnight. No chest pain during my visit. Son, at bedside. Updated him about the care plan.     Review of Systems:   As mentioned in subjective.    Patient Active Problem List   Diagnosis     Generalized muscle weakness     Chronic atrial fibrillation (H)     Anticoagulated by anticoagulation treatment     Suprapubic catheter (H)     HTN (hypertension)     Urinary tract infection associated with cystostomy catheter (H)     Pneumonia of both lungs due to infectious organism     Hypokalemia     Chest pain       Scheduled Meds:    [Held by provider] apixaban ANTICOAGULANT  5 mg Oral BID     doxycycline  100 mg Intravenous Q12H     insulin aspart  1-4 Units Subcutaneous TID w/meals     isosorbide mononitrate  30 mg Oral Daily     metoprolol tartrate  50 mg Oral BID     pantoprazole  40 mg Intravenous Q12H     piperacillin-tazobactam  3.375 g Intravenous Q8H     sodium chloride (PF)  3 mL Intracatheter Q8H     Continuous Infusions:  PRN Meds:.acetaminophen **OR** acetaminophen, calcium carbonate, glucose **OR** dextrose **OR** glucagon, hydrALAZINE, lidocaine 4%, lidocaine (buffered or not buffered), melatonin,  nitroGLYcerin, ondansetron **OR** ondansetron, sodium chloride (PF)    Objective:  Vital signs in last 24 hours:  Temp:  [97.6  F (36.4  C)-98.3  F (36.8  C)] 97.6  F (36.4  C)  Pulse:  [] 72  Resp:  [16-29] 20  BP: ()/() 123/71  SpO2:  [94 %-100 %] 99 %        Intake/Output Summary (Last 24 hours) at 6/22/2023 0935  Last data filed at 6/22/2023 0639  Gross per 24 hour   Intake 580 ml   Output 825 ml   Net -245 ml       Physical Exam:    General: Not in obvious distress.  HEENT: NC, AT   Chest: Clear to auscultation bilaterally  Heart: S1S2 normal, irregular. Systolic murmur noted.   Abdomen: Soft. NT, ND. Bowel sounds- active.  Extremities: No legs swelling  Neuro: Awake, grossly non-focal      Lab Results:(I have personally reviewed the results)    Recent Results (from the past 24 hour(s))   Troponin T, High Sensitivity (now)    Collection Time: 06/21/23 11:48 AM   Result Value Ref Range    Troponin T, High Sensitivity 137 (HH) <=22 ng/L   Nt probnp inpatient    Collection Time: 06/21/23 11:48 AM   Result Value Ref Range    N terminal Pro BNP Inpatient 3,514 (H) 0 - 1,800 pg/mL   Procalcitonin    Collection Time: 06/21/23 11:48 AM   Result Value Ref Range    Procalcitonin 0.07 (H) <0.05 ng/mL   Ferritin    Collection Time: 06/21/23 11:48 AM   Result Value Ref Range    Ferritin 18 (L) 31 - 409 ng/mL   Iron and iron binding capacity    Collection Time: 06/21/23 11:48 AM   Result Value Ref Range    Iron 31 (L) 61 - 157 ug/dL    Iron Binding Capacity 389 240 - 430 ug/dL    Iron Sat Index 8 (L) 15 - 46 %   Glucose by meter    Collection Time: 06/21/23  1:54 PM   Result Value Ref Range    GLUCOSE BY METER POCT 159 (H) 70 - 99 mg/dL   Lactic Acid STAT    Collection Time: 06/21/23  2:13 PM   Result Value Ref Range    Lactic Acid 4.8 (HH) 0.7 - 2.0 mmol/L   Lactic acid whole blood    Collection Time: 06/21/23  3:47 PM   Result Value Ref Range    Lactic Acid 2.4 (H) 0.7 - 2.0 mmol/L   Blood gas venous     Collection Time: 06/21/23  3:47 PM   Result Value Ref Range    pH Venous 7.38 7.35 - 7.45    pCO2 Venous 51 (H) 35 - 50 mm Hg    pO2 Venous 41 25 - 47 mm Hg    Bicarbonate Venous 30 24 - 30 mmol/L    Base Excess/Deficit (+/-) 4.5   mmol/L    Oxyhemoglobin Venous 66.6 (L) 70.0 - 75.0 %    O2 Sat, Venous 68.0 (L) 70.0 - 75.0 %   Lactate Dehydrogenase    Collection Time: 06/21/23  3:47 PM   Result Value Ref Range    Lactate Dehydrogenase 176 0 - 250 U/L   Hemoglobin    Collection Time: 06/21/23  4:55 PM   Result Value Ref Range    Hemoglobin 9.0 (L) 13.3 - 17.7 g/dL   Direct antiglobulin test, adult    Collection Time: 06/21/23  4:55 PM   Result Value Ref Range    ANGELA Anti-IgG,-C3d Negative     SPECIMEN EXPIRATION DATE [id number]    Extra Blue Top Tube    Collection Time: 06/21/23  4:55 PM   Result Value Ref Range    Hold Specimen JIC    Extra Purple Top Tube    Collection Time: 06/21/23  4:55 PM   Result Value Ref Range    Hold Specimen JIC    Glucose by meter    Collection Time: 06/21/23  5:27 PM   Result Value Ref Range    GLUCOSE BY METER POCT 104 (H) 70 - 99 mg/dL   Blood Culture Peripheral Blood    Collection Time: 06/21/23  7:15 PM    Specimen: Peripheral Blood   Result Value Ref Range    Culture No growth after 12 hours    Blood Culture Peripheral Blood    Collection Time: 06/21/23  7:15 PM    Specimen: Peripheral Blood   Result Value Ref Range    Culture No growth after 12 hours    Glucose by meter    Collection Time: 06/21/23  9:49 PM   Result Value Ref Range    GLUCOSE BY METER POCT 130 (H) 70 - 99 mg/dL   Glucose by meter    Collection Time: 06/21/23 11:39 PM   Result Value Ref Range    GLUCOSE BY METER POCT 198 (H) 70 - 99 mg/dL   Asymptomatic COVID-19 Virus (Coronavirus) by PCR Nasopharyngeal    Collection Time: 06/22/23 12:32 AM    Specimen: Nasopharyngeal; Swab   Result Value Ref Range    SARS CoV2 PCR Negative Negative   Comprehensive metabolic panel    Collection Time: 06/22/23  6:18 AM   Result  Value Ref Range    Sodium 142 136 - 145 mmol/L    Potassium 4.9 3.4 - 5.3 mmol/L    Chloride 104 98 - 107 mmol/L    Carbon Dioxide (CO2) 28 22 - 29 mmol/L    Anion Gap 10 7 - 15 mmol/L    Urea Nitrogen 29.6 (H) 8.0 - 23.0 mg/dL    Creatinine 1.34 (H) 0.67 - 1.17 mg/dL    Calcium 8.9 8.2 - 9.6 mg/dL    Glucose 124 (H) 70 - 99 mg/dL    Alkaline Phosphatase 70 40 - 129 U/L    AST 35 0 - 45 U/L    ALT 10 0 - 70 U/L    Protein Total 6.4 6.4 - 8.3 g/dL    Albumin 3.8 3.5 - 5.2 g/dL    Bilirubin Total 1.2 <=1.2 mg/dL    GFR Estimate 50 (L) >60 mL/min/1.73m2   Lipid Profile    Collection Time: 06/22/23  6:18 AM   Result Value Ref Range    Cholesterol 100 <200 mg/dL    Triglycerides 55 <150 mg/dL    Direct Measure HDL 34 (L) >=40 mg/dL    LDL Cholesterol Calculated 55 <=100 mg/dL    Non HDL Cholesterol 66 <130 mg/dL   CBC with platelets and differential    Collection Time: 06/22/23  6:18 AM   Result Value Ref Range    WBC Count 7.4 4.0 - 11.0 10e3/uL    RBC Count 3.78 (L) 4.40 - 5.90 10e6/uL    Hemoglobin 8.9 (L) 13.3 - 17.7 g/dL    Hematocrit 30.1 (L) 40.0 - 53.0 %    MCV 80 78 - 100 fL    MCH 23.5 (L) 26.5 - 33.0 pg    MCHC 29.6 (L) 31.5 - 36.5 g/dL    RDW 16.7 (H) 10.0 - 15.0 %    Platelet Count 139 (L) 150 - 450 10e3/uL    % Neutrophils 69 %    % Lymphocytes 12 %    % Monocytes 18 %    % Eosinophils 0 %    % Basophils 0 %    % Immature Granulocytes 1 %    NRBCs per 100 WBC 0 <1 /100    Absolute Neutrophils 5.1 1.6 - 8.3 10e3/uL    Absolute Lymphocytes 0.9 0.8 - 5.3 10e3/uL    Absolute Monocytes 1.3 0.0 - 1.3 10e3/uL    Absolute Eosinophils 0.0 0.0 - 0.7 10e3/uL    Absolute Basophils 0.0 0.0 - 0.2 10e3/uL    Absolute Immature Granulocytes 0.1 <=0.4 10e3/uL    Absolute NRBCs 0.0 10e3/uL   Glucose by meter    Collection Time: 06/22/23  9:21 AM   Result Value Ref Range    GLUCOSE BY METER POCT 135 (H) 70 - 99 mg/dL       All laboratory and imaging data in the past 24 hours reviewed  Serum Glucose range:   Recent Labs   Lab  06/22/23  0921 06/22/23  0618 06/21/23  2339 06/21/23  2149   * 124* 198* 130*     ABG: No lab results found in last 7 days.  CBC:   Recent Labs   Lab 06/22/23  0618 06/21/23  1655 06/21/23  0913   WBC 7.4  --  6.4   HGB 8.9* 9.0* 9.4*   HCT 30.1*  --  31.8*   MCV 80  --  80   *  --  126*   NEUTROPHIL 69  --  66   LYMPH 12  --  20   MONOCYTE 18  --  13   EOSINOPHIL 0  --  0     Chemistry:   Recent Labs   Lab 06/22/23 0618 06/21/23  0913    141   POTASSIUM 4.9 3.8   CHLORIDE 104 102   CO2 28 29   BUN 29.6* 23.0   CR 1.34* 1.02   GFRESTIMATED 50* 69   JOSESITO 8.9 8.9   MAG  --  2.0   PROTTOTAL 6.4 6.7   ALBUMIN 3.8 4.1   AST 35 15   ALT 10 7   ALKPHOS 70 76   BILITOTAL 1.2 0.9     Coags:  No results for input(s): INR, PROTIME, PTT in the last 168 hours.    Invalid input(s): APTT  Cardiac Markers:  No results for input(s): CKTOTAL, TROPONINI in the last 168 hours.       Chest XR,  PA & LAT    Result Date: 6/21/2023  EXAM: XR CHEST 2 VIEWS LOCATION: [facility] DATE: 6/21/2023 INDICATION: chest pain COMPARISON: 06/26/2022     IMPRESSION: Patchy airspace opacities again noted in the right lower lobe with new opacities in the inferior right upper lobe abutting the fissure. There is a small right effusion. Patchy airspace opacities also noted in the left lower lobe. Findings suggest a bronchopneumonia. Query any risk factors for aspiration? Heart and pulmonary vascularity are normal without signs of failure. Severe degenerative changes in the right shoulder prior left shoulder arthroplasty.      Latest radiology report personally reviewed.    Note created using dragon voice recognition software so sounds alike errors may have escaped editing.      06/22/2023   Boby Arellano MD  Hospitalist, [facility]  Pager: [phone]

## 2023-06-22 NOTE — PROGRESS NOTES
Care Management Chart Review    Length of Stay (days): 1    Expected Discharge Date: 06/23/2023    Concerns to be Addressed:   Workup in progress; currently receiving IV Doxycycline and Zosyn via peripheral IV. Cardiology consulted. Monitoring labs.   Patient plan of care discussed at interdisciplinary rounds: Yes    Anticipated Discharge Disposition:  Home     Anticipated Discharge Services:  To be determined.   Anticipated Discharge DME:  Per therapy (if indicated).    Patient/family educated on Medicare website which has current facility and service quality ratings:   NA  Education Provided on the Discharge Plan:   Per team  Patient/Family in Agreement with the Plan:   Yes    Referrals Placed by CM/SW:   None  Private pay costs discussed: Not applicable at this time.     Additional Information:  Patient is  and independent with activities of daily living at baseline. He and his wife live in assisted living on the campus of [facility] where his wife receives services. He has assist with catheter cares (has a suprapubic catheter) but is independent with activities of daily living and instrumental activities of daily living (IADLs) otherwise. If TCU is recommended, they would prefer [facility].  CM will continue to monitor progression of care, review team recommendations and provide discharge planning assist as needed.      Cinthia Boateng RN

## 2023-06-22 NOTE — PLAN OF CARE
Mentation waxes and wanes. Alert and oriented currently but occasionally disoriented to time and place. On tele, a fib. No shortness of breath or chest pain at rest, shortness of breath with activity. Not OOB this shift, A2 due to weakness. Sat on edge of bed and could not tolerate due to weakness. Suprapubic catheter in place. On 2L NC.

## 2023-06-23 NOTE — PROGRESS NOTES
Care Management Follow Up    Length of Stay (days): 2    Expected Discharge Date: 06/26/2023     Concerns to be Addressed: discharge planning    Patient plan of care discussed at interdisciplinary rounds: Yes    Anticipated Discharge Disposition:  TCU    Anticipated Discharge Services:  TCU   Anticipated Discharge DME:  n/a    Patient/family educated on Medicare website which has current facility and service quality ratings:  yes  Education Provided on the Discharge Plan:  yes  Patient/Family in Agreement with the Plan:  yes    Referrals Placed by CM/SW:  yes  Private pay costs discussed: Not applicable    Additional Information:  Pt from [facility]; therapy recommending TCU for discharge. Referral sent to [facility] TCU from review as pt is from [facility] facility. Care management following.  7:50 AM    MARTY spoke with RN at [facility] (Dai [phone]) to update on discharge recommendations and goals of care. Referral pending at this time for TCU.  11:43 AM    SW met with pt and family (wife and children) in room to discuss discharge recommendations of TCU . Family agreeable and stated that they prefer to have pt in [facility] TCU and declined to give other choices. Referral sent and MARTY spoke with AJ in admissions who stated that he is reviewing referral and if clinically accepted can possible take pt on Monday as they do not take admissions over the weekend. Anticipate a Monday discharge. Care management following.  2:49 PM    DMITRY Miranda  6/23/2023

## 2023-06-23 NOTE — PROGRESS NOTES
HEART CARE CONSULTATON NOTE        Assessment/Recommendations   Assessment:   1.  Chest pain, stable.  2.  Acute systolic congestive heart failure  3.  Chronic atrial fibrillation on Eliquis, holding Eliquis  4.  Melenic stools  5.  Severe iron deficiency anemia  Ferritin   Date Value Ref Range Status   06/21/2023 18 (L) 31 - 409 ng/mL Final   6.  Mild regurgitation, moderate, functional    Plan:   1.  Continue imdur 30 mg   2.  Treat iron deficiency anemia with IV iron, ordered, repeat tomorrow.   3.   Metoprolol 50 mg BID    4.   Consider blood transfusion hemoglobin less than 8.  5.   Lasix 20 mg twice daily  6.  Stop anticoagulation given bleeding.  Discussed stroke risk and ongoing bleeding risk with family.      No further cardiac work-up.        History of Present Illness/Subjective    HPI: David Dow is a 89 year old male history of chronic atrial fibrillation on anticoagulation who presents to [facility] with chest pain which resolved with single dose of nitroglycerin via EMS found to have elevated troponin and ST depressions in lateral leads of his EKG.  On arrival he was hypertensive with systolic blood pressure of 180.  He did not take his metoprolol past 2 days per his son.  More concerning is he also had intermittent dark stools over the past 2 weeks.  He does have anemia.  MCW is low at 80.    S: No further chest pain. Dyspnea stable.  Tolerated IV iron.       Physical Examination  Review of Systems   VITALS: /63 (BP Location: Left arm)   Pulse 63   Temp 97.5  F (36.4  C) (Oral)   Resp 20   SpO2 96%   BMI: There is no height or weight on file to calculate BMI.  Wt Readings from Last 3 Encounters:   06/26/22 61.2 kg (135 lb)       Intake/Output Summary (Last 24 hours) at 6/21/2023 1428  Last data filed at 6/21/2023 1215  Gross per 24 hour   Intake --   Output 300 ml   Net -300 ml     Upright in upright in bed    ENT/Mouth: membranes moist, no oral lesions or bleeding gums.       EYES:  no scleral icterus, normal conjunctivae   Neck: no carotid bruits or thyromegaly   Chest/Lungs:   No rales.     Cardiovascular:   Irregular.  No significant pitting edema   Abdomen:  no organomegaly suprapubic catheter in place.    Extremities: no cyanosis or clubbing   Skin: no xanthelasma, warm.    Neurologic: normal  bilateral, no tremors     Psychiatric: alert and oriented, calm     Review Of Systems  Skin: negative  Eyes: negative  Ears/Nose/Throat: negative  Respiratory: Dyspnea   Cardiovascular: positive for chest pain, dyspnea  Gastrointestinal: negative  Genitourinary: negative  Musculoskeletal: negative  Neurologic: negative  Psychiatric: negative  Hematologic/Lymphatic/Immunologic: negative  Endocrine: negative          Lab Results    Chemistry/lipid CBC Cardiac Enzymes/BNP/TSH/INR   No results for input(s): CHOL, HDL, LDL, TRIG, CHOLHDLRATIO in the last 32762 hours.  No results for input(s): LDL in the last 36647 hours.  Recent Labs   Lab Test 06/21/23  1354 06/21/23  0913   NA  --  141   POTASSIUM  --  3.8   CHLORIDE  --  102   CO2  --  29   * 183*   BUN  --  23.0   CR  --  1.02   GFRESTIMATED  --  69   JOSESITO  --  8.9     Recent Labs   Lab Test 06/21/23  0913 12/07/22  1025 07/06/22  0725   CR 1.02 1.18* 0.98     Recent Labs   Lab Test 06/21/23  0913   A1C 6.0*          Recent Labs   Lab Test 06/21/23  0913   WBC 6.4   HGB 9.4*   HCT 31.8*   MCV 80   *     Recent Labs   Lab Test 06/21/23  0913 12/07/22  1025 07/06/22  0725   HGB 9.4* 13.8 13.0*    Recent Labs   Lab Test 06/26/22  1235   TROPONINI 0.03     Recent Labs   Lab Test 06/21/23  1148   NTBNPI 3,514*     Recent Labs   Lab Test 09/15/21  0655   TSH 2.43     No results for input(s): INR in the last 67964 hours.     Medical History  Surgical History Family History Social History   Past Medical History:   Diagnosis Date     Anticoagulated by anticoagulation treatment 6/26/2022     Chronic atrial fibrillation (H) 6/26/2022      HTN (hypertension) 6/26/2022     Suprapubic catheter (H) 6/26/2022     No past surgical history on file.  No family history on file.     Social History     Socioeconomic History     Marital status:      Spouse name: Not on file     Number of children: Not on file     Years of education: Not on file     Highest education level: Not on file   Occupational History     Not on file   Tobacco Use     Smoking status: Not on file     Smokeless tobacco: Not on file   Substance and Sexual Activity     Alcohol use: Not on file     Drug use: Not on file     Sexual activity: Not on file   Other Topics Concern     Not on file   Social History Narrative     Not on file     Social Determinants of Health     Financial Resource Strain: Not on file   Food Insecurity: Not on file   Transportation Needs: Not on file   Physical Activity: Not on file   Stress: Not on file   Social Connections: Not on file   Intimate Partner Violence: Not on file   Housing Stability: Not on file         Medications  Allergies   No current outpatient medications on file.      No Known Allergies      Scotty Glover,

## 2023-06-23 NOTE — PROGRESS NOTES
[facility]    Medicine Progress Note - Hospitalist Service    Date of Admission:  6/21/2023    Assessment & Plan   David Dow is a [age] year old male with a past medical history of chronic atrial fibrillation on eliquis,  hypertension, chronic suprapubic catheter presented to the ED with chest pain.     Chest pain: Concern for NSTEMI.  -  Cardiology started the patient on Imdur  - Chronically on Eliquis which currently is on hold.  No heparin drip at this time.  - Nitroglycerin, aspirin, beta-blocker  Recent Labs   Lab Test 06/22/23  0618   CHOL 100   HDL 34*   LDL 55   TRIG 55       - TTE- EF 40-45%, moderate concentric LVH, moderately severe MR, mild pulm hypertension  --DC telemetry     Chronic atrial fibrillation: Rate controlled  - Holding Eliquis for today  - Metoprolol  - Telemetry     Acute anemia and chronic thrombocytopenia:  - Given recent episode of melena that he reports 1 to 2 weeks ago and current hemoglobin in the 9 range from baseline of 11-12 range but has had no recent in the past several days of bloody or black stool on Eliquis is concerning for occult intermittent gastrointestinal bleed.  - Holding Eliquis for tonight and monitor for any evidence of bleeding  - No hemoglobin available today.  Recheck hemoglobin every 12 hours.  - Mild iron deficiency noted on iron studies. No e/o hemolysis.   -  PPI     Acute hypoxic pulmonary insufficiency due to CAP and concern for possible pulmonary edema given NSTEMI, BNP elevation  - Sputum culture, respiratory panel PCR, procalcitonin  - Swallow study performed by speech therapy and no evidence of aspiration.  - Nasal MRSA negative  -- Empiric broad-spectrum antimicrobial therapy with doxycycline, Zosyn.  Taper antibiotics pending cultures.  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     Hypertension:  -   Controlled metoprolol, isosorbide mononitrate, hydralazine.     CKD-3  -- Creatine of 1.3 noted this am. Baseline seems to be around 1.1-1.2  -- Monitor       Diet: Regular Diet Adult    DVT Prophylaxis: VTE Prophylaxis contraindicated due to GI bleed  Paul Catheter: Not present  Lines: None     Cardiac Monitoring: ACTIVE order. Indication: Tachyarrhythmias, acute (48 hours)  Code Status: No CPR- Do NOT Intubate      Clinically Significant Risk Factors                  # Hypertension: Noted on problem list                 Disposition Plan      Expected Discharge Date: 06/24/2023      Destination: assisted living  Discharge Comments: IV Lasix, PT/OT          Dominick Jones MD  Hospitalist Service  [facility]  Securely message with [employer] (more info)  Text page via [employer] Paging/Directory   ______________________________________________________________________    Interval History    Has mild chest ache but is not bothering him.  No fever overnight.  On IV antibiotics.  Denies any cough or shortness of breath.  Has chronic indwelling Paul's catheter.  Awaiting placement.    Physical Exam   Vital Signs: Temp: 97.5  F (36.4  C) Temp src: Oral BP: 120/63 Pulse: 63   Resp: 20 SpO2: 96 % O2 Device: Nasal cannula Oxygen Delivery: 2 LPM  Weight: 0 lbs 0 oz    Patient on supplemental oxygen  Systolic murmur  Bilateral inspiratory crackles  No leg edema      Medical Decision Making       35 MINUTES SPENT BY ME on the date of service doing chart review, history, exam, documentation & further activities per the note.  MANAGEMENT DISCUSSED with the following over the past 24 hours: Patient   NOTE(S)/MEDICAL RECORDS REVIEWED over the past 24 hours: Previous notes      Data         Imaging results reviewed over the past 24 hrs:   Recent Results (from the past 24 hour(s))   Echocardiogram Complete   Result Value    LVEF  40-45% (mildly reduced)    Narrative     [id number]  [id number]  [id number]  [id number] [address]     Name: GLO SARMIENTO  MRN: [id number]  : 1930  Study Date: 2023 09:46 AM  Age: 89 yrs  Gender: Male  Patient [facility]  Reason For Study: Acute Myocardial Infarction  Ordering Physician: SIVA THAKUR  Performed By: EMILE     BSA: 1.7 m2  Height: 65 in  Weight: 135 lb  BP: 123/71 mmHg  ______________________________________________________________________________  Procedure  Complete Portable Echo Adult. Definity (NDC #51889-461) given intravenously.  3.0 ml; lot # [id number]. Poor quality two-dimensional was performed and  interpreted. Adequate quality color and spectral Doppler were performed and  interpreted. There is no comparison study available.  ______________________________________________________________________________  Interpretation Summary     Left ventricular function is decreased. The ejection fraction is 40-45%  (mildly reduced). There is moderate concentric left ventricular hypertrophy.  Flattened septum is consistent with RV pressure/volume overload.  The right ventricle is not optimally visualized, but appears to be mildly  dilated.  Both atria are severely dilated.  There is moderately severe (3+) mitral regurgitation.  Mild (35-45mmHg) pulmonary hypertension is present.  IVC diameter >2.1 cm collapsing <50% with sniff suggests a high RA pressure  estimated at 15 mmHg or greater.  There is no comparison study available.  ______________________________________________________________________________  Left Ventricle  The left ventricle is normal in size. Left ventricular function is decreased.  The ejection fraction is 40-45% (mildly reduced). There is moderate concentric  left ventricular hypertrophy. Left ventricular diastolic function is abnormal.  Flattened septum is consistent with RV pressure/volume overload.     Right Ventricle  The right  ventricle is not optimally visualized, but appears to be mildly  dilated. The right ventricular systolic function is normal.     Atria  The left atrium is severely dilated. The right atrium is severely dilated.  There is no color Doppler evidence of an atrial shunt.     Mitral Valve  The mitral valve leaflets are mildly thickened. There is moderately severe  (3+) mitral regurgitation.     Tricuspid Valve  The right ventricular systolic pressure is approximated at 37.9 mmHg plus the  right atrial pressure. Mild (35-45mmHg) pulmonary hypertension is present.  There is moderate (2+) tricuspid regurgitation.     Aortic Valve  The aortic valve is trileaflet. Thickened aortic valve leaflets. There is  trace aortic regurgitation. No aortic stenosis is present.     Pulmonic Valve  The pulmonic valve is not well seen, but is grossly normal. There is mild (1+)  pulmonic valvular regurgitation. There is no pulmonic valvular stenosis.     Vessels  The aorta root is normal. Normal size ascending aorta. IVC diameter >2.1 cm  collapsing <50% with sniff suggests a high RA pressure estimated at 15 mmHg or  greater.     Pericardium  There is no pericardial effusion.     ______________________________________________________________________________  MMode/2D Measurements & Calculations  IVSd: 1.4 cm  LVIDd: 3.6 cm  LVIDs: 2.2 cm  LVPWd: 1.6 cm     FS: 37.6 %  LV mass(C)d: 200.2 grams  LV mass(C)dI: 119.6 grams/m2  Ao root diam: 3.0 cm  asc Aorta Diam: 3.3 cm  LVOT diam: 1.8 cm  LVOT area: 2.5 cm2  LA Volume (BP): 185.0 ml  LA Volume Index (BP): 110.8 ml/m2     LA Volume Indexed (AL/bp): 118.7 ml/m2  RWT: 0.89  TAPSE: 1.8 cm     Doppler Measurements & Calculations  MV E max iva: 77.3 cm/sec  MV max P.9 mmHg  MV mean P.0 mmHg  MV V2 VTI: 21.5 cm  MVA(VTI): 2.3 cm2  MV dec slope: 394.5 cm/sec2  MV dec time: 0.20 sec  Ao V2 max: 145.5 cm/sec  Ao max P.0 mmHg  Ao V2 mean: 112.0 cm/sec  Ao mean P.0 mmHg  Ao V2 VTI: 27.3  cm  SHARON(I,D): 1.8 cm2  SHARON(V,D): 2.0 cm2  LV V1 max P.1 mmHg  LV V1 max: 112.0 cm/sec  LV V1 VTI: 19.3 cm  MR PISA: 4.5 cm2  MR ERO: 0.41 cm2  MR volume: 40.9 ml  SV(LVOT): 49.1 ml  SI(LVOT): 29.3 ml/m2     PA V2 max: 83.6 cm/sec  PA max P.8 mmHg  PA acc time: 0.09 sec  TR max cuong: 308.0 cm/sec  TR max P.9 mmHg  AV Cuong Ratio (DI): 0.77  SHARON Index (cm2/m2): 1.1  E/E' av.6  Lateral E/e': 6.5  Medial E/e': 6.8  RV S Cuong: 7.7 cm/sec     ______________________________________________________________________________  Report approved by: Tiffanie Meza 2023 02:14 PM

## 2023-06-23 NOTE — PLAN OF CARE
Problem: Plan of Care - These are the overarching goals to be used throughout the patient stay.    Goal: Absence of Hospital-Acquired Illness or Injury  Intervention: Prevent Skin Injury  Recent Flowsheet Documentation  Taken 6/22/2023 1630 by Tamika Lainez RN  Body Position:   position changed independently   turned     Problem: Plan of Care - These are the overarching goals to be used throughout the patient stay.    Goal: Optimal Comfort and Wellbeing  Outcome: Progressing       Goal Outcome Evaluation:  Per patient did not have anything to eat or drink for 3 days doctor advanced patient to regular diet, patient did not tolerate much intake. Patient's IV had to be removed at start of shift as it was not flushing, SWAT attempted IV placement and was unsuccessful had to wait for PICC nurse to come with a US machine to locate a deeper vein. Patient has a suprapubic catheter, changed drainage bag from leg bag patient came with from home to a standard hospital drainage bag, patient tolerated this change but requested a new leg bag when he goes home.       Plan of Care Reviewed With: patient    Overall Patient Progress: improvingOverall Patient Progress: improving

## 2023-06-23 NOTE — PLAN OF CARE
Goal Outcome Evaluation:      Plan of Care Reviewed With: patient    Overall Patient Progress: improvingOverall Patient Progress: improving    Alert and oriented. Admitted with chest pain. Patient denies chest pain this shift. PRN nitroglycerine is available. Also, isosorbide mononitrate scheduled daily. 2 liters oxygen NC with oxygen saturation in the upper 90's.  JACOBS. Tele monitoring, afib, bbb. Chronic suprapubic catheter.  Bilateral lower extremity trace edema.  Diabetic, AC and HS blood glucose. Blood glucose at 0200 was 127 mg/dl.  On fall precautions, bed alarm on. Head of bed at  30 degrees/

## 2023-06-24 NOTE — PROGRESS NOTES
HEART CARE CONSULTATON NOTE        Assessment/Recommendations   Assessment:   1.  Chest pain, resolved.    2.  Acute systolic congestive heart failure, ongoing dyspnea.    3.  Chronic atrial fibrillation on Eliquis, holding Eliquis given GI bleeding  4.  Melenic stools  5.  Severe iron deficiency anemia  Ferritin   Date Value Ref Range Status   06/21/2023 18 (L) 31 - 409 ng/mL Final   6.  Mild regurgitation, moderate, functional    Plan:   1.   Continue imdur 30 mg   2.   Metoprolol 50 mg BID    3.   Hold on diuretics.    4.   Consider blood transfusion hemoglobin less than 8  5.  Stop anticoagulation given bleeding.  Discussed stroke risk and ongoing bleeding risk with family.  6.  Outpatient clinic follow-up with [facility] in 3-4 weeks.        No further cardiac work-up. Cardiology will sign off.        History of Present Illness/Subjective    HPI: David Dow is a 89 year old male history of chronic atrial fibrillation on anticoagulation who presents to [facility] with chest pain which resolved with single dose of nitroglycerin via EMS found to have elevated troponin and ST depressions in lateral leads of his EKG.  On arrival he was hypertensive with systolic blood pressure of 180.  He did not take his metoprolol past 2 days per his son.  More concerning is he also had intermittent dark stools over the past 2 weeks.  He does have anemia.  MCW is low at 80.    S: No active chest pain.  Patient worked with physical therapy well today.  He did notice some dyspnea with prolonged walking.  No lightheadedness.  No syncope.  He has no lower extremity edema at this time.  Did not want to eat much much of his breakfast this morning.  Denies any fever chills sweats.  Discussed case with Dr. Jones.     Physical Examination  Review of Systems   VITALS: /71 (BP Location: Left arm)   Pulse 63   Temp 97.9  F (36.6  C) (Oral)   Resp 16   SpO2 94%   BMI: There is no height or weight on file to calculate BMI.  Wt  Readings from Last 3 Encounters:   06/26/22 61.2 kg (135 lb)       Intake/Output Summary (Last 24 hours) at 6/21/2023 1428  Last data filed at 6/21/2023 1215  Gross per 24 hour   Intake --   Output 300 ml   Net -300 ml     Upright in upright in bed  pleasant, upright in chair   ENT/Mouth: membranes moist, no oral lesions or bleeding gums.      EYES:  no scleral icterus, normal conjunctivae   Neck: no carotid bruits or thyromegaly   Chest/Lungs:   No rales..  No change.  No change   Cardiovascular:   Irregular.  No significant pitting edema       Extremities: no cyanosis or clubbing   Skin: no xanthelasma, warm.    Neurologic: normal  bilateral, no tremors     Psychiatric: alert and oriented, calm     Review Of Systems  Skin: negative  Eyes: negative  Ears/Nose/Throat: negative  Respiratory: Dyspnea   Cardiovascular: No chest pain past 48 hours.  Ongoing dyspnea  Gastrointestinal: negative  Genitourinary: negative  Musculoskeletal: negative  Neurologic: negative  Psychiatric: negative  Hematologic/Lymphatic/Immunologic: negative  Endocrine: negative          Lab Results    Chemistry/lipid CBC Cardiac Enzymes/BNP/TSH/INR   No results for input(s): CHOL, HDL, LDL, TRIG, CHOLHDLRATIO in the last 77758 hours.  No results for input(s): LDL in the last 80317 hours.  Recent Labs   Lab Test 06/21/23  1354 06/21/23  0913   NA  --  141   POTASSIUM  --  3.8   CHLORIDE  --  102   CO2  --  29   * 183*   BUN  --  23.0   CR  --  1.02   GFRESTIMATED  --  69   JOSESITO  --  8.9     Recent Labs   Lab Test 06/21/23  0913 12/07/22  1025 07/06/22  0725   CR 1.02 1.18* 0.98     Recent Labs   Lab Test 06/21/23  0913   A1C 6.0*          Recent Labs   Lab Test 06/21/23  0913   WBC 6.4   HGB 9.4*   HCT 31.8*   MCV 80   *     Recent Labs   Lab Test 06/21/23  0913 12/07/22  1025 07/06/22  0725   HGB 9.4* 13.8 13.0*    Recent Labs   Lab Test 06/26/22  1235   TROPONINI 0.03     Recent Labs   Lab Test 06/21/23  1148   NTBNPI 3,514*      Recent Labs   Lab Test 09/15/21  0655   TSH 2.43     No results for input(s): INR in the last 97603 hours.     Medical History  Surgical History Family History Social History   Past Medical History:   Diagnosis Date     Anticoagulated by anticoagulation treatment 6/26/2022     Chronic atrial fibrillation (H) 6/26/2022     HTN (hypertension) 6/26/2022     Suprapubic catheter (H) 6/26/2022     No past surgical history on file.  No family history on file.     Social History     Socioeconomic History     Marital status:      Spouse name: Not on file     Number of children: Not on file     Years of education: Not on file     Highest education level: Not on file   Occupational History     Not on file   Tobacco Use     Smoking status: Not on file     Smokeless tobacco: Not on file   Substance and Sexual Activity     Alcohol use: Not on file     Drug use: Not on file     Sexual activity: Not on file   Other Topics Concern     Not on file   Social History Narrative     Not on file     Social Determinants of Health     Financial Resource Strain: Not on file   Food Insecurity: Not on file   Transportation Needs: Not on file   Physical Activity: Not on file   Stress: Not on file   Social Connections: Not on file   Intimate Partner Violence: Not on file   Housing Stability: Not on file         Medications  Allergies   No current outpatient medications on file.      No Known Allergies      Scotty Glover, DO

## 2023-06-24 NOTE — PROGRESS NOTES
I let Dr Jones and P2 PharmD know that pt is currently without an IV site (since 08:15). SWAT tried, then texted PICC to try to start IV.     I called CRNA and she is currently attempting to place an IV.

## 2023-06-24 NOTE — PROGRESS NOTES
[facility]    Medicine Progress Note - Hospitalist Service    Date of Admission:  6/21/2023    Assessment & Plan   David Dow is a [age] year old male with a past medical history of chronic atrial fibrillation on eliquis,  hypertension, chronic suprapubic catheter presented to the ED with chest pain.     Chest pain: Concern for NSTEMI.  -  Cardiology started the patient on Imdur  - Chronically on Eliquis which currently is on hold.  No heparin drip at this time.  - Nitroglycerin, beta-blocker continue Imdur.  Aspirin discontinued by cardiology.  Recent Labs   Lab Test 06/22/23  0618   CHOL 100   HDL 34*   LDL 55   TRIG 55       - TTE- EF 40-45%, moderate concentric LVH, moderately severe MR, mild pulm hypertension  --DC telemetry     Chronic atrial fibrillation: Rate controlled  -  DC Eliquis as per cardiology due to GI bleed.  As per Dr. Glover family does not want any procedures.  -  Continue metoprolol.  -  DC telemetry     Acute anemia and chronic thrombocytopenia:  - Given recent episode of melena that he reports 1 to 2 weeks ago and current hemoglobin in the 9 range from baseline of 11-12 range but has had no recent in the past several days of bloody or black stool on Eliquis is concerning for occult intermittent gastrointestinal bleed.  -  DC Eliquis as per cardiology  - Hemoglobin is stable  - Mild iron deficiency noted on iron studies. No e/o hemolysis.  IV iron 200 mg given on 6/22.  Patient has lost IV access and therefore will hold off on IV iron.  Discussed with patient's son.  -  PPI     Acute hypoxic pulmonary insufficiency due to CAP and concern for possible pulmonary edema given NSTEMI, BNP elevation  -  Sputum cultures are unremarkable  - Swallow study performed by speech therapy and no evidence of aspiration.  - Nasal MRSA negative  --  Due to loss of IV access change IV to p.o. medications.  Patient completed 3 days of IV antibiotics and will need for 4 days days of.   P.o. antibiotics.  Changed to Augmentin and doxycycline p.o.  -- IV Lasix discontinued due to increased creatinine  --  Ordered Mucinex 600 mg twice daily  --Believe patient is becoming oxygen dependent and could be discharged to TCU with oxygen.     Acute kidney injury on CKD 3 likely due to overdiuresis  --Baseline creatinine of less than 1.3  -- DC IV Lasix  -- Recheck creatinine next a.m.    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     Hypertension:  -  Controlled metoprolol, isosorbide mononitrate, hydralazine.    Goals of care: Patient has poor appetite and does not want any procedures.  Broached the topic of hospice with the patient's son but he refused.  Will evaluate him later to see if patient continues to refuse eating.     Diet: Regular Diet Adult    DVT Prophylaxis: VTE Prophylaxis contraindicated due to GI bleed  Paul Catheter: Not present  Lines: None     Cardiac Monitoring: None  Code Status: No CPR- Do NOT Intubate      Clinically Significant Risk Factors                  # Hypertension: Noted on problem list                 Disposition Plan     Expected Discharge Date: 06/26/2023    Discharge Delays: Placement - TCU  Destination: assisted living  Discharge Comments: IV Lasix, PT/OT          Dominick Jones MD  Hospitalist Service  [facility]  Securely message with [employer] (more info)  Text page via [employer] Paging/Directory   ______________________________________________________________________    Interval History    Creatinine jumped to 1.5.  Will hold IV diuresis.  Discussed with Dr. Glover.  Continue to hold Eliquis.  Patient has poor appetite.  Hemoglobin is stable.  IV access lost.  Convert IV medications to p.o.    Physical Exam   Vital Signs: Temp: 97.9  F (36.6  C) Temp src: Oral BP: 133/71 Pulse: 63   Resp: 16 SpO2: 94 % O2 Device: Nasal cannula Oxygen Delivery: 2 LPM  Weight: 0 lbs 0 oz    Patient on supplemental  oxygen  Systolic murmur  Bilateral inspiratory crackles  No leg edema      Medical Decision Making       55 MINUTES SPENT BY ME on the date of service doing chart review, history, exam, documentation & further activities per the note.  MANAGEMENT DISCUSSED with the following over the past 24 hours: Patient's son Carlos A  NOTE(S)/MEDICAL RECORDS REVIEWED over the past 24 hours: Previous notes      Data     I have personally reviewed the following data over the past 24 hrs:    N/A  \   9.8 (L)   / N/A     141 102 39.4 (H) /  96   4.6 27 1.52 (H) \       Imaging results reviewed over the past 24 hrs:   No results found for this or any previous visit (from the past 24 hour(s)).

## 2023-06-24 NOTE — PLAN OF CARE
Problem: Plan of Care - These are the overarching goals to be used throughout the patient stay.    Goal: Optimal Comfort and Wellbeing  Outcome: Progressing     Problem: Risk for Delirium  Goal: Improved Sleep  Outcome: Progressing     Problem: Risk for Delirium  Goal: Improved Behavioral Control  Intervention: Minimize Safety Risk  Recent Flowsheet Documentation  Taken 6/23/2023 1528 by Marilou Christina RN  Enhanced Safety Measures: room near unit station   Goal Outcome Evaluation:       Patient AAO. Denies pain. On IV abx. Tolerating 2 L O2 NC. VSS. Patient denying chest pain this shift. Hgb 9.2, next check at midnight. Paul patent and draining clear yellow urine. Call light within reach, can make needs known. No further concerns identified at this time.

## 2023-06-24 NOTE — PLAN OF CARE
Goal Outcome Evaluation:    Alert and oriented, on 2L of O2. Has chronic suprapubic catheter, darning. Pt slept in between cares.

## 2023-06-24 NOTE — PLAN OF CARE
Problem: Plan of Care - These are the overarching goals to be used throughout the patient stay.    Goal: Absence of Hospital-Acquired Illness or Injury  Outcome: Progressing  Intervention: Identify and Manage Fall Risk  Recent Flowsheet Documentation  Taken 6/24/2023 0815 by Mandie Johnston, RN  Safety Promotion/Fall Prevention:   activity supervised   assistive device/personal items within reach   nonskid shoes/slippers when out of bed   patient and family education   room door open   room near nurse's station   room organization consistent   supervised activity     Problem: Plan of Care - These are the overarching goals to be used throughout the patient stay.    Goal: Optimal Comfort and Wellbeing  Outcome: Progressing   Goal Outcome Evaluation:      Plan of Care Reviewed With: patient

## 2023-06-24 NOTE — PROGRESS NOTES
Care Management Follow Up    Length of Stay (days): 3    Expected Discharge Date: 06/26/2023     Concerns to be Addressed: medical progression, TCU Placement   Patient plan of care discussed at interdisciplinary rounds: Yes    Anticipated Discharge Disposition:  TCU     Anticipated Discharge Services:    Anticipated Discharge DME:      Patient/family educated on Medicare website which has current facility and service quality ratings:    Education Provided on the Discharge Plan:    Patient/Family in Agreement with the Plan:      Referrals Placed by CM/SW:  TCU  Private pay costs discussed: Not applicable    Additional Information:  Social history:  Pt is from [facility]. He lives with his wife and she receives services and he is independent with ADLs except he gets assist with catheter cares ( has a suprapubic catheter). Transport TBD.    Therapy recommendation is TCU and pt and family in agreement but only to [facility] TCU at this time. Previous CM spoke to AJ in admissions at [facility] and he is reviewing and if pt is clinically accepted would not be able to admit until Monday 6/26 due to not taking admissions over the weekend so CM will need to follow up with admissions first thing on Monday morning.      Mansi Gallegos, RN

## 2023-06-25 NOTE — PROVIDER NOTIFICATION
"Pt complaining of achy, medial to left chest pain along with \"underneath the ribcage, by the kidney on the right.\" PRN sublingual nitroglycerin given. Dr. Jones text paged and updated. New order to give tylenol.   "

## 2023-06-25 NOTE — PLAN OF CARE
"Problem: Plan of Care - These are the overarching goals to be used throughout the patient stay.    Goal: Plan of Care Review  Outcome: Progressing  Discussed POC with pt and pt's son. Answered any questions/concerns the two had.     Pt had multiple family members visiting earlier today. Pt enjoying the company. Now very excited, sleeping between cares now. Pt very pleasant, sad at times stating, \"I didn't think it (getting sick/old) would be this hard.\" Emotional support provided.     Pt up to chair few times with A1 and walker. Pt continuing to have poor appetite. Ate 1/2 of his oatmeal for breakfast and sipped some of his Atkins Shake for lunch that his family brought in. Pt weaned off of oxygen.     GONZALO DUPONT RN  "

## 2023-06-25 NOTE — PLAN OF CARE
Occupational Therapy Discharge Summary    Reason for therapy discharge:    Patient/family request discontinuation of services.    Progress towards therapy goal(s). See goals on Care Plan in [facility] electronic health record for goal details.  Goals not met.  Barriers to achieving goals:   Pt. declined  further therapy services.    Therapy recommendation(s):    No further therapy is recommended.

## 2023-06-25 NOTE — PROGRESS NOTES
[facility]    Medicine Progress Note - Hospitalist Service    Date of Admission:  6/21/2023    Assessment & Plan   David Dow is a [age] year old male with a past medical history of chronic atrial fibrillation on eliquis,  hypertension, chronic suprapubic catheter presented to the ED with chest pain.     Chest pain: Concern for NSTEMI.  -  Cardiology started the patient on Imdur  - Chronically on Eliquis which currently is on hold.  No heparin drip at this time.  - Nitroglycerin, beta-blocker continue Imdur.  Aspirin discontinued by cardiology.  Recent Labs   Lab Test 06/22/23  0618   CHOL 100   HDL 34*   LDL 55   TRIG 55       - TTE- EF 40-45%, moderate concentric LVH, moderately severe MR, mild pulm hypertension  --DC telemetry     Chronic atrial fibrillation: Rate controlled  -  DC Eliquis as per cardiology due to GI bleed.  As per Dr. Glover family does not want any procedures.  Patient's son understands the risk of stroke.  -  Continue metoprolol.  -  DC telemetry     Acute anemia and chronic thrombocytopenia:  - Given recent episode of melena that he reports 1 to 2 weeks ago and current hemoglobin in the 9 range from baseline of 11-12 range but has had no recent in the past several days of bloody or black stool on Eliquis is concerning for occult intermittent gastrointestinal bleed.  -  DC Eliquis as per cardiology  - Hemoglobin is stable  - Mild iron deficiency noted on iron studies. No e/o hemolysis.  IV iron 200 mg given on 6/22.  Patient has lost IV access and therefore will hold off on IV iron.  Discussed with patient's son.  -  PPI     Acute hypoxic pulmonary insufficiency due to CAP and concern for possible pulmonary edema given NSTEMI, BNP elevation  -  Sputum cultures are unremarkable  - Swallow study performed by speech therapy and no evidence of aspiration.  - Nasal MRSA negative  --  Due to loss of IV access change IV to p.o. medications.  Patient completed 3 days of IV  antibiotics and will need for 4 days days of.  P.o. antibiotics.  Changed to Augmentin and doxycycline p.o.  -- IV Lasix discontinued due to increased creatinine  -- Continue Mucinex 600 mg twice daily  -Patient was weaned off oxygen on 6/25.     Acute kidney injury on CKD 3 likely due to overdiuresis  --Baseline creatinine of less than 1.3  -- DC IV Lasix  Creatinine is stable.  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     Hypertension:  -  Controlled metoprolol, isosorbide mononitrate, hydralazine.    Goals of care: Patient has poor appetite and does not want any procedures.  Patient's son is amenable to hospice if patient does not improve at the TCU.  TCU provider to consult hospice if appropriate.    Diet: Regular Diet Adult    DVT Prophylaxis: VTE Prophylaxis contraindicated due to GI bleed  Paul Catheter: Not present  Lines: None     Cardiac Monitoring: None  Code Status: No CPR- Do NOT Intubate      Clinically Significant Risk Factors                  # Hypertension: Noted on problem list                 Disposition Plan      Expected Discharge Date: 06/26/2023    Discharge Delays: Placement - TCU  *Medically Ready for Discharge  Destination: assisted living  Discharge Comments: TCU Placement          Dominick Jones MD  Hospitalist Service  [facility]  Securely message with [employer] (more info)  Text page via [employer] Paging/Directory   ______________________________________________________________________    Interval History    Creatinine is stable at 1.49.  Discussed with the patient's son about goals of care.  He is agreeable to hospice if patient does not improve at the TCU.  Does not want any aggressive measures.  Does not want clear admission.  Appetite remains poor    Physical Exam   Vital Signs: Temp: 98  F (36.7  C) Temp src: Oral BP: (!) 169/82 Pulse: 59   Resp: 16 SpO2: 93 % O2 Device:  None (Room air) Oxygen Delivery: 1 LPM  Weight: 0 lbs 0 oz    Patient is off supplemental oxygen  Systolic murmur  Bilateral inspiratory coarse crackles  No leg edema      Medical Decision Making       35 MINUTES SPENT BY ME on the date of service doing chart review, history, exam, documentation & further activities per the note.  MANAGEMENT DISCUSSED with the following over the past 24 hours: Patient's son Carlos A  NOTE(S)/MEDICAL RECORDS REVIEWED over the past 24 hours: Previous notes      Data     I have personally reviewed the following data over the past 24 hrs:    7.8  \   9.3 (L)   / 160     141 102 42.1 (H) /  98   4.3 28 1.49 (H) \       Imaging results reviewed over the past 24 hrs:   No results found for this or any previous visit (from the past 24 hour(s)).

## 2023-06-25 NOTE — PLAN OF CARE
Problem: Plan of Care - These are the overarching goals to be used throughout the patient stay.    Goal: Absence of Hospital-Acquired Illness or Injury  Intervention: Prevent Skin Injury  Recent Flowsheet Documentation  Taken 6/24/2023 1700 by Tamika Lainez RN  Body Position:   supine   supine, legs elevated     Problem: Plan of Care - These are the overarching goals to be used throughout the patient stay.    Goal: Optimal Comfort and Wellbeing  Outcome: Not Progressing     Problem: Risk for Delirium  Goal: Improved Sleep  Outcome: Not Progressing     Goal Outcome Evaluation:      Plan of Care Reviewed With: patient    Overall Patient Progress: no changeOverall Patient Progress: no change

## 2023-06-25 NOTE — PLAN OF CARE
Problem: Plan of Care - These are the overarching goals to be used throughout the patient stay.    Goal: Optimal Comfort and Wellbeing  Outcome: Progressing     Problem: Risk for Delirium  Goal: Improved Sleep  Outcome: Progressing   Goal Outcome Evaluation:    Patient alert, oriented x 4 with some forgetfulness. Denied pain overnight. Repositioned prn for comfort. Noted sleeping well during the night. Will keep monitoring.

## 2023-06-26 NOTE — PROGRESS NOTES
Care Management Discharge Note    Discharge Date: 06/26/2023       Discharge Disposition: Transitional Care, Skilled Nursing Facility    Discharge Services: None    Discharge DME: Oxygen    Discharge Transportation: health plan transportation    Private pay costs discussed: transportation costs- discussed with aaliyah Strauss    Does the patient's insurance plan have a 3 day qualifying hospital stay waiver?  No    PAS Confirmation Code:  [id number])  Patient/family educated on Medicare website which has current facility and service quality ratings: yes    Education Provided on the Discharge Plan:    Persons Notified of Discharge Plans: patient, son Carlos A in patient's room  Patient/Family in Agreement with the Plan:      Handoff Referral Completed: Yes    Additional Information:  Patient is discharging to [facility] TCU.  Confirmed with AJ in admissions at [facility] that patient has a bed available today.  Family requesting wheelchair ride, costs discussed. 2L NC.   Wheelchair ride scheduled for 1140am -1220pm.  [id number].        LAZ Girard

## 2023-06-26 NOTE — PROGRESS NOTES
Physical Therapy Discharge Summary    Reason for therapy discharge:    Discharged to TCU.    Progress towards therapy goal(s). See goals on Care Plan in [facility] electronic health record for goal details.  Goals partially met.  Barriers to achieving goals:   discharge from facility.    Therapy recommendation(s):    Further recommended therapy is related to documented deficits, and is necessary to maximize functional independence in order for patient to return to prior level of function.      Destiny Nolen, MEGHAN 6/26/2023

## 2023-06-26 NOTE — DISCHARGE SUMMARY
[facility]    Discharge Summary  Hospitalist    Date of Admission:  6/21/2023  Date of Discharge:  6/26/2023 12:09 PM  Discharging Provider: Sully Hogan MD  Date of Service (when I saw the patient): 06/26/23    Discharge Diagnoses   Chest pain    History of Present Illness   David Dow is an 89 year old male who presented with chest pain    Hospital Course   David Dow is a [age] year old male with a past medical history of chronic atrial fibrillation on eliquis,  hypertension, chronic suprapubic catheter presented to the ED with chest pain.     Chest pain: Concern for NSTEMI.  -  Cardiology started the patient on Imdur  - Chronically on Eliquis which was discontinued by cardiologist due to h/o melena.  No heparin drip.  - Nitroglycerin, beta-blocker continue Imdur.  Aspirin discontinued by cardiology.   TTE- EF 40-45%, moderate concentric LVH, moderately severe MR, mild pulm hypertension  --DC telemetry  -patient and family would like to pursue hospice in the near future.     Chronic atrial fibrillation: Rate controlled  -  DC Eliquis as per cardiology due to GI bleed.  As per Dr. Glover family does not want any procedures.  Patient's son understands the risk of stroke.  -  Continue metoprolol.     Acute anemia and chronic thrombocytopenia:  - Given recent episode of melena that he reports 1 to 2 weeks ago and current hemoglobin in the 9 range from baseline of 11-12 range but has had no recent in the past several days of bloody or black stool on Eliquis is concerning for occult intermittent gastrointestinal bleed.  -  DC Eliquis as per cardiology  - Hemoglobin is stable  - Mild iron deficiency noted on iron studies. No e/o hemolysis.  IV iron 200 mg given on 6/22.  Patient has lost IV access and therefore will hold off on IV iron.  Discussed with patient's son.  -  PPI     Acute hypoxic pulmonary insufficiency due to CAP and concern for possible pulmonary edema given NSTEMI, BNP  elevation  -  Sputum cultures are unremarkable  - Swallow study performed by speech therapy and no evidence of aspiration.  - Nasal MRSA negative  --  Due to loss of IV access change IV to p.o. medications.  Patient completed 3 days of IV antibiotics and will po. antibiotics.  Changed to Augmentin and doxycycline p.o. till 6/28.  -- IV Lasix discontinued due to increased creatinine  -- Continue Mucinex 600 mg twice daily     Acute kidney injury on CKD 3 likely due to overdiuresis  --Baseline creatinine of less than 1.3  -- DC IV Lasix  Creatinine is stable.  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     Hypertension:  -  Controlled metoprolol, isosorbide mononitrate, hydralazine.     Goals of care: Patient has poor appetite and does not want any procedures.  Patient's son is amenable to hospice if patient does not improve at the TCU.  TCU provider to consult hospice if appropriate.      Sully Hogan MD    Significant Results and Procedures   See below    Pending Results   These results will be followed up by TCU MD  Unresulted Labs Ordered in the Past 30 Days of this Admission     Date and Time Order Name Status Description    6/21/2023  4:33 PM Blood Culture Peripheral Blood Preliminary     6/21/2023  4:33 PM Blood Culture Peripheral Blood Preliminary           Code Status   DNR / DNI       Primary Care Physician   Provider Not In System    General.  Awake not in acute distress. Frail elderly  HEENT. anicteric, EOM intact.  Neck supple no JVD.  CVS irregular rhythm   Lungs.no wheezing or rales.  Abdomen.  Soft nontender bowel sounds present.  Extremities.  No edema   Neurological.  General weakness.  Skin no rash.   Psych. Impaired cognition    Discharge Disposition   Discharged to TCU  Condition at discharge: Fair    Consultations This Hospital Stay   PHYSICAL THERAPY ADULT IP CONSULT  OCCUPATIONAL THERAPY ADULT IP  CONSULT  SPEECH LANGUAGE PATH ADULT IP CONSULT  CARDIOLOGY IP CONSULT  [facility]  PHYSICAL THERAPY ADULT IP CONSULT  OCCUPATIONAL THERAPY ADULT IP CONSULT    Time Spent on this Encounter   I, Sully Hogan MD, personally saw the patient today and spent greater than 30 minutes discharging this patient.    Discharge Orders      General info for SNF    Length of Stay Estimate: Short Term Care: Estimated # of Days <30  Condition at Discharge: Stable  Level of care:skilled   Rehabilitation Potential: Fair  Admission H&P remains valid and up-to-date: Yes  Recent Chemotherapy: N/A  Use Nursing Home Standing Orders: Yes     Mantoux instructions    Give two-step Mantoux (PPD) Per Facility Policy Yes     Follow Up and recommended labs and tests    Follow up with prison physician.  The following labs/tests are recommended: cbc, bmp.     Reason for your hospital stay    Chest pain  Melena     Activity - Up with nursing assistance     No CPR- Do NOT Intubate     Physical Therapy Adult Consult    Evaluate and treat as clinically indicated.    Reason:  weakness     Occupational Therapy Adult Consult    Evaluate and treat as clinically indicated.    Reason:  weakness     Oxygen (SNF/TCU) Discharge     Fall precautions     Diet    Follow this diet upon discharge: Orders Placed This Encounter      Regular Diet Adult     Discharge Medications   Discharge Medication List as of 6/26/2023 10:51 AM      START taking these medications    Details   amoxicillin-clavulanate (AUGMENTIN) 500-125 MG tablet Take 1 tablet by mouth every 12 hours for 2 days, Transitional      calcium carbonate (TUMS) 500 MG chewable tablet Take 1 tablet (500 mg) by mouth 3 times daily as needed for heartburn, Transitional      doxycycline monohydrate (MONODOX) 100 MG capsule Take 1 capsule (100 mg) by mouth every 12 hours for 2 days, Transitional      isosorbide mononitrate (IMDUR) 30 MG 24 hr tablet Take 1 tablet (30 mg) by mouth daily,  Transitional      menthol-zinc oxide (CALMOSEPTINE) 0.44-20.6 % OINT ointment Apply topically 4 times daily as needed for skin protectionTransitional      nitroGLYcerin (NITROSTAT) 0.4 MG sublingual tablet For chest pain place 1 tablet under the tongue every 5 minutes for 3 doses. If symptoms persist 5 minutes after 1st dose call 911., Transitional         CONTINUE these medications which have NOT CHANGED    Details   metoprolol tartrate (LOPRESSOR) 50 MG tablet Take 50 mg by mouth 2 times daily, Historical      pantoprazole (PROTONIX) 20 MG EC tablet Take 20 mg by mouth daily, Historical      vitamin D3 (CHOLECALCIFEROL) 50 mcg (2000 units) tablet Take 1 tablet by mouth daily, Historical         STOP taking these medications       apixaban ANTICOAGULANT (ELIQUIS) 5 MG tablet Comments:   Reason for Stopping:         furosemide (LASIX) 40 MG tablet Comments:   Reason for Stopping:         potassium chloride ER (MICRO-K) 10 MEQ CR capsule Comments:   Reason for Stopping:             Allergies   No Known Allergies  Data   Most Recent 3 CBC's:Recent Labs   Lab Test 06/25/23  0629 06/24/23  1758 06/24/23  0623 06/23/23  1742 06/22/23  0618 06/21/23  1655 06/21/23  0913   WBC 7.8  --   --   --  7.4  --  6.4   HGB 9.3* 9.4* 9.8*   < > 8.9*   < > 9.4*   MCV 81  --   --   --  80  --  80     --   --   --  139*  --  126*    < > = values in this interval not displayed.      Most Recent 3 BMP's:  Recent Labs   Lab Test 06/25/23  0629 06/24/23  2150 06/24/23  1723 06/24/23  0747 06/24/23  0623 06/22/23  0921 06/22/23  0618     --   --   --  141  --  142   POTASSIUM 4.3  --   --   --  4.6  --  4.9   CHLORIDE 102  --   --   --  102  --  104   CO2 28  --   --   --  27  --  28   BUN 42.1*  --   --   --  39.4*  --  29.6*   CR 1.49*  --   --   --  1.52*  --  1.34*   ANIONGAP 11  --   --   --  12  --  10   JOSESITO 8.7  --   --   --  9.3  --  8.9   GLC 98 110* 128*   < > 100*   < > 124*    < > = values in this interval not  displayed.     Most Recent 2 LFT's:  Recent Labs   Lab Test 2318 23  0913   AST 35 15   ALT 10 7   ALKPHOS 70 76   BILITOTAL 1.2 0.9     Most Recent INR's and Anticoagulation Dosing History:  Anticoagulation Dose History         No data to display              Most Recent 3 Troponin's:No lab results found.  Most Recent Cholesterol Panel:  Recent Labs   Lab Test 2318   CHOL 100   LDL 55   HDL 34*   TRIG 55     Most Recent 6 Bacteria Isolates From Any Culture (See EPIC Reports for Culture Details):No lab results found.  Most Recent TSH, T4 and A1c Labs:  Recent Labs   Lab Test 2313 09/15/21  0655   TSH  --  2.43   A1C 6.0*  --      Results for orders placed or performed during the hospital encounter of 23   Chest XR,  PA & LAT    Narrative    EXAM: XR CHEST 2 VIEWS  LOCATION: [facility]  DATE: 2023    INDICATION: chest pain  COMPARISON: 2022      Impression    IMPRESSION: Patchy airspace opacities again noted in the right lower lobe with new opacities in the inferior right upper lobe abutting the fissure. There is a small right effusion. Patchy airspace opacities also noted in the left lower lobe. Findings   suggest a bronchopneumonia.     Query any risk factors for aspiration?    Heart and pulmonary vascularity are normal without signs of failure. Severe degenerative changes in the right shoulder prior left shoulder arthroplasty.   Echocardiogram Complete     Value    LVEF  40-45% (mildly reduced)    Narrative    [id number]  [id number]  [id number]  [id number]^SIV[place] [address]     Name: GLO SARMIENTO  MRN: [id number]  : 1930  Study Date: 2023 09:46 AM  Age: 89 yrs  Gender: Male  Patient Location: [facility]  Reason For Study: Acute Myocardial Infarction  Ordering Physician: SIVA THAKUR  Performed By: EMILE     BSA: 1.7 m2  Height: 65 in  Weight: 135 lb  BP:  123/71 mmHg  ______________________________________________________________________________  Procedure  Complete Portable Echo Adult. Definity (NDC #72308-618) given intravenously.  3.0 ml; lot # [id number]. Poor quality two-dimensional was performed and  interpreted. Adequate quality color and spectral Doppler were performed and  interpreted. There is no comparison study available.  ______________________________________________________________________________  Interpretation Summary     Left ventricular function is decreased. The ejection fraction is 40-45%  (mildly reduced). There is moderate concentric left ventricular hypertrophy.  Flattened septum is consistent with RV pressure/volume overload.  The right ventricle is not optimally visualized, but appears to be mildly  dilated.  Both atria are severely dilated.  There is moderately severe (3+) mitral regurgitation.  Mild (35-45mmHg) pulmonary hypertension is present.  IVC diameter >2.1 cm collapsing <50% with sniff suggests a high RA pressure  estimated at 15 mmHg or greater.  There is no comparison study available.  ______________________________________________________________________________  Left Ventricle  The left ventricle is normal in size. Left ventricular function is decreased.  The ejection fraction is 40-45% (mildly reduced). There is moderate concentric  left ventricular hypertrophy. Left ventricular diastolic function is abnormal.  Flattened septum is consistent with RV pressure/volume overload.     Right Ventricle  The right ventricle is not optimally visualized, but appears to be mildly  dilated. The right ventricular systolic function is normal.     Atria  The left atrium is severely dilated. The right atrium is severely dilated.  There is no color Doppler evidence of an atrial shunt.     Mitral Valve  The mitral valve leaflets are mildly thickened. There is moderately severe  (3+) mitral regurgitation.     Tricuspid Valve  The right ventricular  systolic pressure is approximated at 37.9 mmHg plus the  right atrial pressure. Mild (35-45mmHg) pulmonary hypertension is present.  There is moderate (2+) tricuspid regurgitation.     Aortic Valve  The aortic valve is trileaflet. Thickened aortic valve leaflets. There is  trace aortic regurgitation. No aortic stenosis is present.     Pulmonic Valve  The pulmonic valve is not well seen, but is grossly normal. There is mild (1+)  pulmonic valvular regurgitation. There is no pulmonic valvular stenosis.     Vessels  The aorta root is normal. Normal size ascending aorta. IVC diameter >2.1 cm  collapsing <50% with sniff suggests a high RA pressure estimated at 15 mmHg or  greater.     Pericardium  There is no pericardial effusion.     ______________________________________________________________________________  MMode/2D Measurements & Calculations  IVSd: 1.4 cm  LVIDd: 3.6 cm  LVIDs: 2.2 cm  LVPWd: 1.6 cm     FS: 37.6 %  LV mass(C)d: 200.2 grams  LV mass(C)dI: 119.6 grams/m2  Ao root diam: 3.0 cm  asc Aorta Diam: 3.3 cm  LVOT diam: 1.8 cm  LVOT area: 2.5 cm2  LA Volume (BP): 185.0 ml  LA Volume Index (BP): 110.8 ml/m2     LA Volume Indexed (AL/bp): 118.7 ml/m2  RWT: 0.89  TAPSE: 1.8 cm     Doppler Measurements & Calculations  MV E max cuong: 77.3 cm/sec  MV max P.9 mmHg  MV mean P.0 mmHg  MV V2 VTI: 21.5 cm  MVA(VTI): 2.3 cm2  MV dec slope: 394.5 cm/sec2  MV dec time: 0.20 sec  Ao V2 max: 145.5 cm/sec  Ao max P.0 mmHg  Ao V2 mean: 112.0 cm/sec  Ao mean P.0 mmHg  Ao V2 VTI: 27.3 cm  SHARON(I,D): 1.8 cm2  SHARON(V,D): 2.0 cm2  LV V1 max P.1 mmHg  LV V1 max: 112.0 cm/sec  LV V1 VTI: 19.3 cm  MR PISA: 4.5 cm2  MR ERO: 0.41 cm2  MR volume: 40.9 ml  SV(LVOT): 49.1 ml  SI(LVOT): 29.3 ml/m2     PA V2 max: 83.6 cm/sec  PA max P.8 mmHg  PA acc time: 0.09 sec  TR max cuong: 308.0 cm/sec  TR max P.9 mmHg  AV Cuong Ratio (DI): 0.77  SHARON Index (cm2/m2): 1.1  E/E' av.6  Lateral E/e': 6.5  Medial E/e': 6.8  RV S  Cuong: 7.7 cm/sec     ______________________________________________________________________________  Report approved by: Tiffanie Meza 06/22/2023 02:14 PM

## 2023-06-26 NOTE — PLAN OF CARE
"  Problem: Plan of Care - These are the overarching goals to be used throughout the patient stay.    Goal: Plan of Care Review  Description: The Plan of Care Review/Shift note should be completed every shift.  The Outcome Evaluation is a brief statement about your assessment that the patient is improving, declining, or no change.  This information will be displayed automatically on your shift note.  Outcome: Progressing  Goal: Patient-Specific Goal (Individualized)  Description: You can add care plan individualizations to a care plan. Examples of Individualization might be:  \"Parent requests to be called daily at 9am for status\", \"I have a hard time hearing out of my right ear\", or \"Do not touch me to wake me up as it startles me\".  Outcome: Progressing  Goal: Optimal Comfort and Wellbeing  Outcome: Progressing  Goal: Readiness for Transition of Care  Outcome: Progressing   Goal Outcome Evaluation:  re.                  "